# Patient Record
Sex: FEMALE | Race: WHITE | NOT HISPANIC OR LATINO | Employment: FULL TIME | URBAN - METROPOLITAN AREA
[De-identification: names, ages, dates, MRNs, and addresses within clinical notes are randomized per-mention and may not be internally consistent; named-entity substitution may affect disease eponyms.]

---

## 2020-01-23 ENCOUNTER — APPOINTMENT (OUTPATIENT)
Dept: GENERAL RADIOLOGY | Facility: HOSPITAL | Age: 50
End: 2020-01-23

## 2020-01-23 ENCOUNTER — APPOINTMENT (OUTPATIENT)
Dept: CT IMAGING | Facility: HOSPITAL | Age: 50
End: 2020-01-23

## 2020-01-23 ENCOUNTER — HOSPITAL ENCOUNTER (OUTPATIENT)
Facility: HOSPITAL | Age: 50
Setting detail: OBSERVATION
Discharge: HOME | End: 2020-01-24
Attending: EMERGENCY MEDICINE | Admitting: INTERNAL MEDICINE

## 2020-01-23 DIAGNOSIS — R29.90 NEW ONSET SEIZURE WITH ABNORMAL NEUROLOGICAL EXAM WITHOUT HEAD TRAUMA (HCC): Primary | ICD-10-CM

## 2020-01-23 DIAGNOSIS — R56.9 NEW ONSET SEIZURE WITH ABNORMAL NEUROLOGICAL EXAM WITHOUT HEAD TRAUMA (HCC): Primary | ICD-10-CM

## 2020-01-23 DIAGNOSIS — G45.9 TIA (TRANSIENT ISCHEMIC ATTACK): ICD-10-CM

## 2020-01-23 DIAGNOSIS — I10 ELEVATED BLOOD PRESSURE READING WITH DIAGNOSIS OF HYPERTENSION: ICD-10-CM

## 2020-01-23 DIAGNOSIS — B34.9 ACUTE VIRAL SYNDROME: ICD-10-CM

## 2020-01-23 DIAGNOSIS — R56.9 SEIZURE-LIKE ACTIVITY (HCC): ICD-10-CM

## 2020-01-23 DIAGNOSIS — R53.1 RIGHT SIDED WEAKNESS: ICD-10-CM

## 2020-01-23 PROBLEM — J06.9 UPPER RESPIRATORY INFECTION: Status: ACTIVE | Noted: 2020-01-23

## 2020-01-23 PROBLEM — I63.9 CVA (CEREBRAL VASCULAR ACCIDENT) (HCC): Status: ACTIVE | Noted: 2020-01-23

## 2020-01-23 PROBLEM — Z72.0 TOBACCO ABUSE: Status: ACTIVE | Noted: 2020-01-23

## 2020-01-23 LAB
ALBUMIN SERPL-MCNC: 4.9 G/DL (ref 3.5–5.2)
ALBUMIN/GLOB SERPL: 1.6 G/DL
ALP SERPL-CCNC: 71 U/L (ref 39–117)
ALT SERPL W P-5'-P-CCNC: 35 U/L (ref 1–33)
ALT SERPL W P-5'-P-CCNC: 46 U/L (ref 1–33)
ANION GAP SERPL CALCULATED.3IONS-SCNC: 15 MMOL/L (ref 5–15)
APTT PPP: 27.1 SECONDS (ref 24–37)
AST SERPL-CCNC: 43 U/L (ref 1–32)
AST SERPL-CCNC: 43 U/L (ref 1–32)
B PARAPERT DNA SPEC QL NAA+PROBE: NOT DETECTED
B PERT DNA SPEC QL NAA+PROBE: NOT DETECTED
BASE EXCESS BLDA CALC-SCNC: 7 MMOL/L (ref -5–5)
BASOPHILS # BLD AUTO: 0.06 10*3/MM3 (ref 0–0.2)
BASOPHILS NFR BLD AUTO: 0.7 % (ref 0–1.5)
BILIRUB SERPL-MCNC: 0.4 MG/DL (ref 0.2–1.2)
BUN BLD-MCNC: 7 MG/DL (ref 6–20)
BUN/CREAT SERPL: 8.2 (ref 7–25)
C PNEUM DNA NPH QL NAA+NON-PROBE: NOT DETECTED
CA-I BLDA-SCNC: 1.23 MMOL/L (ref 1.2–1.32)
CALCIUM SPEC-SCNC: 9.7 MG/DL (ref 8.6–10.5)
CHLORIDE SERPL-SCNC: 98 MMOL/L (ref 98–107)
CO2 BLDA-SCNC: 35 MMOL/L (ref 24–29)
CO2 SERPL-SCNC: 28 MMOL/L (ref 22–29)
CREAT BLD-MCNC: 0.85 MG/DL (ref 0.57–1)
CREAT BLDA-MCNC: 0.9 MG/DL (ref 0.6–1.3)
D-LACTATE SERPL-SCNC: 1.8 MMOL/L (ref 0.5–2)
DEPRECATED RDW RBC AUTO: 38.8 FL (ref 37–54)
DEPRECATED RDW RBC AUTO: 40.6 FL (ref 37–54)
EOSINOPHIL # BLD AUTO: 0.11 10*3/MM3 (ref 0–0.4)
EOSINOPHIL NFR BLD AUTO: 1.2 % (ref 0.3–6.2)
ERYTHROCYTE [DISTWIDTH] IN BLOOD BY AUTOMATED COUNT: 11.4 % (ref 12.3–15.4)
ERYTHROCYTE [DISTWIDTH] IN BLOOD BY AUTOMATED COUNT: 11.5 % (ref 12.3–15.4)
FLUAV H1 2009 PAND RNA NPH QL NAA+PROBE: NOT DETECTED
FLUAV H1 HA GENE NPH QL NAA+PROBE: NOT DETECTED
FLUAV H3 RNA NPH QL NAA+PROBE: NOT DETECTED
FLUAV SUBTYP SPEC NAA+PROBE: NOT DETECTED
FLUBV RNA ISLT QL NAA+PROBE: NOT DETECTED
GFR SERPL CREATININE-BSD FRML MDRD: 71 ML/MIN/1.73
GLOBULIN UR ELPH-MCNC: 3 GM/DL
GLUCOSE BLD-MCNC: 88 MG/DL (ref 65–99)
HADV DNA SPEC NAA+PROBE: NOT DETECTED
HCO3 BLDA-SCNC: 32.9 MMOL/L (ref 22–26)
HCOV 229E RNA SPEC QL NAA+PROBE: NOT DETECTED
HCOV HKU1 RNA SPEC QL NAA+PROBE: NOT DETECTED
HCOV NL63 RNA SPEC QL NAA+PROBE: NOT DETECTED
HCOV OC43 RNA SPEC QL NAA+PROBE: NOT DETECTED
HCT VFR BLD AUTO: 46.2 % (ref 34–46.6)
HCT VFR BLD AUTO: 47.7 % (ref 34–46.6)
HCT VFR BLDA CALC: 47 % (ref 38–51)
HGB BLD-MCNC: 15 G/DL (ref 12–15.9)
HGB BLD-MCNC: 15.9 G/DL (ref 12–15.9)
HGB BLDA-MCNC: 16 G/DL (ref 12–17)
HMPV RNA NPH QL NAA+NON-PROBE: NOT DETECTED
HOLD SPECIMEN: NORMAL
HOLD SPECIMEN: NORMAL
HPIV1 RNA SPEC QL NAA+PROBE: NOT DETECTED
HPIV2 RNA SPEC QL NAA+PROBE: NOT DETECTED
HPIV3 RNA NPH QL NAA+PROBE: NOT DETECTED
HPIV4 P GENE NPH QL NAA+PROBE: NOT DETECTED
IMM GRANULOCYTES # BLD AUTO: 0.02 10*3/MM3 (ref 0–0.05)
IMM GRANULOCYTES NFR BLD AUTO: 0.2 % (ref 0–0.5)
INR PPP: 1 (ref 0.8–1.2)
LYMPHOCYTES # BLD AUTO: 4.05 10*3/MM3 (ref 0.7–3.1)
LYMPHOCYTES NFR BLD AUTO: 45.7 % (ref 19.6–45.3)
M PNEUMO IGG SER IA-ACNC: NOT DETECTED
MCH RBC QN AUTO: 30.6 PG (ref 26.6–33)
MCH RBC QN AUTO: 31.1 PG (ref 26.6–33)
MCHC RBC AUTO-ENTMCNC: 32.5 G/DL (ref 31.5–35.7)
MCHC RBC AUTO-ENTMCNC: 33.3 G/DL (ref 31.5–35.7)
MCV RBC AUTO: 91.7 FL (ref 79–97)
MCV RBC AUTO: 95.7 FL (ref 79–97)
MONOCYTES # BLD AUTO: 0.51 10*3/MM3 (ref 0.1–0.9)
MONOCYTES NFR BLD AUTO: 5.8 % (ref 5–12)
NEUTROPHILS # BLD AUTO: 4.11 10*3/MM3 (ref 1.7–7)
NEUTROPHILS NFR BLD AUTO: 46.4 % (ref 42.7–76)
NRBC BLD AUTO-RTO: 0 /100 WBC (ref 0–0.2)
PCO2 BLDA: 62 MM HG (ref 35–45)
PH BLDA: 7.33 PH UNITS (ref 7.35–7.6)
PLATELET # BLD AUTO: 249 10*3/MM3 (ref 140–450)
PLATELET # BLD AUTO: 285 10*3/MM3 (ref 140–450)
PMV BLD AUTO: 10.8 FL (ref 6–12)
PMV BLD AUTO: 10.8 FL (ref 6–12)
PO2 BLDA: 20 MMHG (ref 80–105)
POTASSIUM BLD-SCNC: 4.7 MMOL/L (ref 3.5–5.2)
POTASSIUM BLDA-SCNC: 4.6 MMOL/L (ref 3.5–4.9)
PROT SERPL-MCNC: 7.9 G/DL (ref 6–8.5)
PROTHROMBIN TIME: 11.4 SECONDS (ref 12.8–15.2)
RBC # BLD AUTO: 4.83 10*6/MM3 (ref 3.77–5.28)
RBC # BLD AUTO: 5.2 10*6/MM3 (ref 3.77–5.28)
RHINOVIRUS RNA SPEC NAA+PROBE: NOT DETECTED
RSV RNA NPH QL NAA+NON-PROBE: NOT DETECTED
SAO2 % BLDA: 27 % (ref 95–98)
SODIUM BLD-SCNC: 141 MMOL/L (ref 136–145)
SODIUM BLDA-SCNC: 141 MMOL/L (ref 138–146)
TROPONIN T SERPL-MCNC: <0.01 NG/ML (ref 0–0.03)
WBC NRBC COR # BLD: 8.86 10*3/MM3 (ref 3.4–10.8)
WBC NRBC COR # BLD: 9.44 10*3/MM3 (ref 3.4–10.8)
WHOLE BLOOD HOLD SPECIMEN: NORMAL
WHOLE BLOOD HOLD SPECIMEN: NORMAL

## 2020-01-23 PROCEDURE — G0378 HOSPITAL OBSERVATION PER HR: HCPCS

## 2020-01-23 PROCEDURE — 85610 PROTHROMBIN TIME: CPT

## 2020-01-23 PROCEDURE — 99406 BEHAV CHNG SMOKING 3-10 MIN: CPT | Performed by: FAMILY MEDICINE

## 2020-01-23 PROCEDURE — 93005 ELECTROCARDIOGRAM TRACING: CPT | Performed by: EMERGENCY MEDICINE

## 2020-01-23 PROCEDURE — 99220 PR INITIAL OBSERVATION CARE/DAY 70 MINUTES: CPT | Performed by: FAMILY MEDICINE

## 2020-01-23 PROCEDURE — 85730 THROMBOPLASTIN TIME PARTIAL: CPT | Performed by: EMERGENCY MEDICINE

## 2020-01-23 PROCEDURE — 84132 ASSAY OF SERUM POTASSIUM: CPT

## 2020-01-23 PROCEDURE — 85027 COMPLETE CBC AUTOMATED: CPT | Performed by: FAMILY MEDICINE

## 2020-01-23 PROCEDURE — 84450 TRANSFERASE (AST) (SGOT): CPT | Performed by: EMERGENCY MEDICINE

## 2020-01-23 PROCEDURE — 84460 ALANINE AMINO (ALT) (SGPT): CPT | Performed by: EMERGENCY MEDICINE

## 2020-01-23 PROCEDURE — 85014 HEMATOCRIT: CPT

## 2020-01-23 PROCEDURE — 99285 EMERGENCY DEPT VISIT HI MDM: CPT

## 2020-01-23 PROCEDURE — 82803 BLOOD GASES ANY COMBINATION: CPT

## 2020-01-23 PROCEDURE — 84484 ASSAY OF TROPONIN QUANT: CPT | Performed by: EMERGENCY MEDICINE

## 2020-01-23 PROCEDURE — 0042T HC CT CEREBRAL PERFUSION W/WO CONTRAST: CPT

## 2020-01-23 PROCEDURE — 83605 ASSAY OF LACTIC ACID: CPT | Performed by: EMERGENCY MEDICINE

## 2020-01-23 PROCEDURE — 71045 X-RAY EXAM CHEST 1 VIEW: CPT

## 2020-01-23 PROCEDURE — 82565 ASSAY OF CREATININE: CPT

## 2020-01-23 PROCEDURE — 70496 CT ANGIOGRAPHY HEAD: CPT

## 2020-01-23 PROCEDURE — 70498 CT ANGIOGRAPHY NECK: CPT

## 2020-01-23 PROCEDURE — 82330 ASSAY OF CALCIUM: CPT

## 2020-01-23 PROCEDURE — 84295 ASSAY OF SERUM SODIUM: CPT

## 2020-01-23 PROCEDURE — 0100U HC BIOFIRE FILMARRAY RESP PANEL 2: CPT | Performed by: FAMILY MEDICINE

## 2020-01-23 PROCEDURE — 82947 ASSAY GLUCOSE BLOOD QUANT: CPT

## 2020-01-23 PROCEDURE — 70450 CT HEAD/BRAIN W/O DYE: CPT

## 2020-01-23 PROCEDURE — 80053 COMPREHEN METABOLIC PANEL: CPT | Performed by: FAMILY MEDICINE

## 2020-01-23 PROCEDURE — 85025 COMPLETE CBC W/AUTO DIFF WBC: CPT | Performed by: EMERGENCY MEDICINE

## 2020-01-23 PROCEDURE — 0 IOPAMIDOL PER 1 ML: Performed by: EMERGENCY MEDICINE

## 2020-01-23 RX ORDER — ONDANSETRON 2 MG/ML
4 INJECTION INTRAMUSCULAR; INTRAVENOUS EVERY 6 HOURS PRN
Status: DISCONTINUED | OUTPATIENT
Start: 2020-01-23 | End: 2020-01-24 | Stop reason: HOSPADM

## 2020-01-23 RX ORDER — SODIUM CHLORIDE 0.9 % (FLUSH) 0.9 %
10 SYRINGE (ML) INJECTION EVERY 12 HOURS SCHEDULED
Status: DISCONTINUED | OUTPATIENT
Start: 2020-01-23 | End: 2020-01-24 | Stop reason: HOSPADM

## 2020-01-23 RX ORDER — ASPIRIN 300 MG/1
300 SUPPOSITORY RECTAL DAILY
Status: DISCONTINUED | OUTPATIENT
Start: 2020-01-23 | End: 2020-01-24 | Stop reason: HOSPADM

## 2020-01-23 RX ORDER — ACETAMINOPHEN 650 MG/1
650 SUPPOSITORY RECTAL EVERY 4 HOURS PRN
Status: DISCONTINUED | OUTPATIENT
Start: 2020-01-23 | End: 2020-01-24 | Stop reason: HOSPADM

## 2020-01-23 RX ORDER — ATORVASTATIN CALCIUM 40 MG/1
80 TABLET, FILM COATED ORAL NIGHTLY
Status: DISCONTINUED | OUTPATIENT
Start: 2020-01-23 | End: 2020-01-24 | Stop reason: HOSPADM

## 2020-01-23 RX ORDER — LORAZEPAM 2 MG/ML
1 INJECTION INTRAMUSCULAR EVERY 4 HOURS PRN
Status: DISCONTINUED | OUTPATIENT
Start: 2020-01-23 | End: 2020-01-24 | Stop reason: HOSPADM

## 2020-01-23 RX ORDER — ASPIRIN 81 MG/1
81 TABLET, CHEWABLE ORAL DAILY
Status: DISCONTINUED | OUTPATIENT
Start: 2020-01-23 | End: 2020-01-24 | Stop reason: HOSPADM

## 2020-01-23 RX ORDER — BISACODYL 10 MG
10 SUPPOSITORY, RECTAL RECTAL DAILY PRN
Status: DISCONTINUED | OUTPATIENT
Start: 2020-01-23 | End: 2020-01-24 | Stop reason: HOSPADM

## 2020-01-23 RX ORDER — SODIUM CHLORIDE 0.9 % (FLUSH) 0.9 %
10 SYRINGE (ML) INJECTION AS NEEDED
Status: DISCONTINUED | OUTPATIENT
Start: 2020-01-23 | End: 2020-01-24 | Stop reason: HOSPADM

## 2020-01-23 RX ORDER — DOCUSATE SODIUM 100 MG/1
100 CAPSULE, LIQUID FILLED ORAL 2 TIMES DAILY PRN
Status: DISCONTINUED | OUTPATIENT
Start: 2020-01-23 | End: 2020-01-24 | Stop reason: HOSPADM

## 2020-01-23 RX ORDER — ACETAMINOPHEN 325 MG/1
650 TABLET ORAL EVERY 4 HOURS PRN
Status: DISCONTINUED | OUTPATIENT
Start: 2020-01-23 | End: 2020-01-24 | Stop reason: HOSPADM

## 2020-01-23 RX ORDER — SODIUM CHLORIDE 9 MG/ML
100 INJECTION, SOLUTION INTRAVENOUS CONTINUOUS
Status: DISCONTINUED | OUTPATIENT
Start: 2020-01-23 | End: 2020-01-24 | Stop reason: HOSPADM

## 2020-01-23 RX ADMIN — IOPAMIDOL 115 ML: 755 INJECTION, SOLUTION INTRAVENOUS at 16:20

## 2020-01-23 RX ADMIN — ASPIRIN 81 MG 81 MG: 81 TABLET ORAL at 21:43

## 2020-01-23 RX ADMIN — SODIUM CHLORIDE 100 ML/HR: 9 INJECTION, SOLUTION INTRAVENOUS at 23:29

## 2020-01-23 RX ADMIN — SODIUM CHLORIDE, POTASSIUM CHLORIDE, SODIUM LACTATE AND CALCIUM CHLORIDE 1000 ML: 600; 310; 30; 20 INJECTION, SOLUTION INTRAVENOUS at 16:43

## 2020-01-23 RX ADMIN — ACETAMINOPHEN 650 MG: 325 TABLET, FILM COATED ORAL at 21:43

## 2020-01-23 RX ADMIN — ATORVASTATIN CALCIUM 80 MG: 40 TABLET, FILM COATED ORAL at 21:43

## 2020-01-23 NOTE — ED PROVIDER NOTES
"Subjective   The patient presents to the ER with CC of right side weakness. Patient last known well at 11:30am and family found her with deficit at around 2pm. The patient cannot recall when she developed symptoms inside that window. The patient has a hx of htn. She is not on any anticoagulant. No hx of similar. The flight crew reports that ground EMS witnessed \"seizure like activity\" and they gave 5mg of Ativan. Patient denies any hx of seizure. Flight reports flacid right side and completely aphasic on initial contact that has improved enroute. The patient does report viral symptoms for the past few days.       History provided by:  EMS personnel and patient      Review of Systems   Respiratory: Negative.    Cardiovascular: Negative.    Musculoskeletal: Positive for myalgias.   Neurological: Positive for seizures, weakness, numbness and headaches.   Psychiatric/Behavioral: Negative.    All other systems reviewed and are negative.      Past Medical History:   Diagnosis Date   • Hypertension        No Known Allergies    Past Surgical History:   Procedure Laterality Date   • CHOLECYSTECTOMY     • HYSTERECTOMY      PARTIAL   • TUBAL ABDOMINAL LIGATION         History reviewed. No pertinent family history.    Social History     Socioeconomic History   • Marital status: Significant Other     Spouse name: Not on file   • Number of children: Not on file   • Years of education: Not on file   • Highest education level: Not on file   Tobacco Use   • Smoking status: Current Every Day Smoker     Packs/day: 0.50     Types: Cigarettes, Electronic Cigarette   Substance and Sexual Activity   • Alcohol use: Never     Frequency: Never   • Drug use: Never           Objective   Physical Exam   Constitutional: She is oriented to person, place, and time. She appears well-developed and well-nourished.   HENT:   Head: Normocephalic and atraumatic.   Mouth/Throat: Oropharynx is clear and moist.   Eyes: Pupils are equal, round, and reactive " to light. EOM are normal.   Neck: Normal range of motion.   Cardiovascular: Normal rate, regular rhythm, normal heart sounds and intact distal pulses.   Pulmonary/Chest: Effort normal and breath sounds normal. No respiratory distress.   Abdominal: Soft. Bowel sounds are normal. There is no tenderness.   Musculoskeletal: She exhibits no tenderness.   Neurological: She is alert and oriented to person, place, and time.   The patient has mild right arm drift with minimal right leg lift against gravity. No facial asymmetry. Mild aphasia. Mild decreased sensation on right. NIHSS 5 on arrival.    Skin: Skin is warm and dry. Capillary refill takes less than 2 seconds.   Psychiatric: She has a normal mood and affect. Her behavior is normal.   Nursing note and vitals reviewed.      Critical Care  Performed by: Max Antonio MD  Authorized by: Max Antonio MD     Critical care provider statement:     Critical care time (minutes):  45    Critical care end time:  1/23/2020 6:22 PM    Critical care time was exclusive of:  Separately billable procedures and treating other patients    Critical care was necessary to treat or prevent imminent or life-threatening deterioration of the following conditions:  CNS failure or compromise    Critical care was time spent personally by me on the following activities:  Development of treatment plan with patient or surrogate, discussions with consultants, examination of patient, ordering and review of laboratory studies, ordering and review of radiographic studies, pulse oximetry and re-evaluation of patient's condition               ED Course  ED Course as of Jan 23 1822   u Jan 23, 2020   1622 Patient evaluated on arrival with the interventional stroke team. Patient currently outside the window for TPA. Will order perfusion and further studies for treatment options.     [RS]   1628 Negative perfusion for acute ischemic infarct.   CT Cerebral Perfusion With & Without Contrast  [RS]   1739 Patient with findings most c/w new onset seizure and viral syndrome. Will admit for further evaluation. Hospitalist paged for admission.    [RS]   1743 Case discussed with Dr. Littlejohn who will admit.     [RS]   1816 Lactate: 1.8 [RS]      ED Course User Index  [RS] Max Antonio MD                                               MDM  Number of Diagnoses or Management Options  Acute viral syndrome:   Elevated blood pressure reading with diagnosis of hypertension:   New onset seizure with abnormal neurological exam without head trauma (CMS/HCC):   Right sided weakness:   Diagnosis management comments: Recent Results (from the past 24 hour(s))  -POC Protime / INR  Collection Time: 01/23/20  4:20 PM       Result                      Value             Ref Range           Protime                     11.4 (L)          12.8 - 15.2 *       INR                         1.0               0.8 - 1.2      -POC Surgery Labs  Collection Time: 01/23/20  4:21 PM       Result                      Value             Ref Range           Ionized Calcium             1.23              1.20 - 1.32 *       POC Potassium               4.6               3.5 - 4.9 mm*       Sodium                      141               138 - 146 mm*       Total CO2                   35 (H)            24 - 29 mmol*       Hemoglobin                  16.0              12.0 - 17.0 *       Hematocrit                  47                38 - 51 %           pCO2, Arterial              62.0 (H)          35 - 45 mm Hg       pO2, Arterial               20 (L)            80 - 105 mmHg       Base Excess                 7.0000 (H)        -5 - 5 mmol/L       O2 Saturation, Arterial     27 (L)            95 - 98 %           pH, Arterial                7.33 (L)          7.35 - 7.6 p*       HCO3, Arterial              32.9 (H)          22 - 26 mmol*  -POC Creatinine  Collection Time: 01/23/20  4:21 PM       Result                      Value             Ref Range            Creatinine                  0.90              0.60 - 1.30 *  -aPTT  Collection Time: 01/23/20  4:26 PM       Result                      Value             Ref Range           PTT                         27.1              24.0 - 37.0 *  -Light Blue Top  Collection Time: 01/23/20  4:26 PM       Result                      Value             Ref Range           Extra Tube                                                    hold for add-on  -Gold Top - SST  Collection Time: 01/23/20  4:26 PM       Result                      Value             Ref Range           Extra Tube                                                    Hold for add-ons.  -Troponin  Collection Time: 01/23/20  4:27 PM       Result                      Value             Ref Range           Troponin T                  <0.010            0.000 - 0.03*  -AST  Collection Time: 01/23/20  4:27 PM       Result                      Value             Ref Range           AST (SGOT)                  43 (H)            1 - 32 U/L     -ALT  Collection Time: 01/23/20  4:27 PM       Result                      Value             Ref Range           ALT (SGPT)                  35 (H)            1 - 33 U/L     -Green Top (Gel)  Collection Time: 01/23/20  4:27 PM       Result                      Value             Ref Range           Extra Tube                                                    Hold for add-ons.  -Lavender Top  Collection Time: 01/23/20  4:27 PM       Result                      Value             Ref Range           Extra Tube                                                    hold for add-on  -CBC Auto Differential  Collection Time: 01/23/20  4:27 PM       Result                      Value             Ref Range           WBC                         8.86              3.40 - 10.80*       RBC                         5.20              3.77 - 5.28 *       Hemoglobin                  15.9              12.0 - 15.9 *       Hematocrit                  47.7  (H)          34.0 - 46.6 %       MCV                         91.7              79.0 - 97.0 *       MCH                         30.6              26.6 - 33.0 *       MCHC                        33.3              31.5 - 35.7 *       RDW                         11.4 (L)          12.3 - 15.4 %       RDW-SD                      38.8              37.0 - 54.0 *       MPV                         10.8              6.0 - 12.0 fL       Platelets                   285               140 - 450 10*       Neutrophil %                46.4              42.7 - 76.0 %       Lymphocyte %                45.7 (H)          19.6 - 45.3 %       Monocyte %                  5.8               5.0 - 12.0 %        Eosinophil %                1.2               0.3 - 6.2 %         Basophil %                  0.7               0.0 - 1.5 %         Immature Grans %            0.2               0.0 - 0.5 %         Neutrophils, Absolute       4.11              1.70 - 7.00 *       Lymphocytes, Absolute       4.05 (H)          0.70 - 3.10 *       Monocytes, Absolute         0.51              0.10 - 0.90 *       Eosinophils, Absolute       0.11              0.00 - 0.40 *       Basophils, Absolute         0.06              0.00 - 0.20 *       Immature Grans, Absolu*     0.02              0.00 - 0.05 *       nRBC                        0.0               0.0 - 0.2 /1*  -Lactic Acid, Plasma  Collection Time: 01/23/20  5:38 PM       Result                      Value             Ref Range           Lactate                     1.8               0.5 - 2.0 mm*  Note: In addition to lab results from this visit, the labs listed above may include labs taken at another facility or during a different encounter within the last 24 hours. Please correlate lab times with ED admission and discharge times for further clarification of the services performed during this visit.    XR Chest 1 View   Final Result    No active disease.         DICTATED:   01/23/2020    EDITED/ls :   " 01/23/2020          This report was finalized on 1/23/2020 6:14 PM by Dr. Chapincito Jorgensen MD.          CT Cerebral Perfusion With & Without Contrast   Preliminary Result    No perfusion defect to suggest reversible ischemia or core    infarct. Consider follow-up MRI imaging of the brain for for detailed    evaluation, particularly for small vessel ischemic change.                              CT Angiogram Head   Preliminary Result    No evidence for hemodynamically significant stenosis or    occlusion of the head or neck vasculature.         Nonspecific prominent anterior posterior chain lymph nodes identified,    the largest within the posterior superior lymph node chain measuring up    to 7 mm in short axis. Correlate physical exam.               CT Angiogram Neck   Preliminary Result    No evidence for hemodynamically significant stenosis or    occlusion of the head or neck vasculature.         Nonspecific prominent anterior posterior chain lymph nodes identified,    the largest within the posterior superior lymph node chain measuring up    to 7 mm in short axis. Correlate physical exam.               CT Head Without Contrast Stroke Protocol   Preliminary Result    No acute intracranial process is appreciated.               --------------------------------------               01/23/20 01/23/20                  1637          1715     --------------------------------------   BP:          176/91      (!) 182/69   BP Location:    Left arm                  Patient Position:      Lying                   Pulse:         63            79       Resp:          16                     Temp:   98.4 °F (36.9 °C)             TempSrc:      Oral                    SpO2:          98%          97%       Weight: 90.7 kg (200 lb)              Height:  165.1 cm (65\")              --------------------------------------  Medications  sodium chloride 0.9 % flush 10 mL (has no " administration in time range)  iopamidol (ISOVUE-370) 76 % injection 150 mL (115 mL Intravenous Given 1/23/20 1620)  lactated ringers bolus 1,000 mL (1,000 mL Intravenous New Bag 1/23/20 1643)  ECG/EMG Results (last 24 hours)     Procedure Component Value Units Date/Time    ECG 12 Lead ([id number]) Collected:  01/23/20 1636     Updated:  01/23/20 1635      ECG 12 Lead               Amount and/or Complexity of Data Reviewed  Clinical lab tests: reviewed  Tests in the radiology section of CPT®: reviewed  Decide to obtain previous medical records or to obtain history from someone other than the patient: yes  Obtain history from someone other than the patient: yes  Discuss the patient with other providers: yes  Independent visualization of images, tracings, or specimens: yes    Critical Care  Total time providing critical care: 30-74 minutes      Final diagnoses:   New onset seizure with abnormal neurological exam without head trauma (CMS/HCC)   Right sided weakness   Acute viral syndrome   Elevated blood pressure reading with diagnosis of hypertension            aMx Antonio MD  01/23/20 1187

## 2020-01-24 ENCOUNTER — APPOINTMENT (OUTPATIENT)
Dept: MRI IMAGING | Facility: HOSPITAL | Age: 50
End: 2020-01-24

## 2020-01-24 ENCOUNTER — APPOINTMENT (OUTPATIENT)
Dept: NEUROLOGY | Facility: HOSPITAL | Age: 50
End: 2020-01-24

## 2020-01-24 ENCOUNTER — APPOINTMENT (OUTPATIENT)
Dept: CARDIOLOGY | Facility: HOSPITAL | Age: 50
End: 2020-01-24

## 2020-01-24 VITALS
HEIGHT: 65 IN | DIASTOLIC BLOOD PRESSURE: 84 MMHG | RESPIRATION RATE: 18 BRPM | BODY MASS INDEX: 33.32 KG/M2 | WEIGHT: 200 LBS | SYSTOLIC BLOOD PRESSURE: 178 MMHG | HEART RATE: 63 BPM | OXYGEN SATURATION: 92 % | TEMPERATURE: 98 F

## 2020-01-24 PROBLEM — G45.9 TIA (TRANSIENT ISCHEMIC ATTACK): Status: ACTIVE | Noted: 2020-01-23

## 2020-01-24 LAB
ALBUMIN SERPL-MCNC: 3.8 G/DL (ref 3.5–5.2)
ALBUMIN/GLOB SERPL: 1.5 G/DL
ALP SERPL-CCNC: 56 U/L (ref 39–117)
ALT SERPL W P-5'-P-CCNC: 36 U/L (ref 1–33)
ANION GAP SERPL CALCULATED.3IONS-SCNC: 10 MMOL/L (ref 5–15)
AST SERPL-CCNC: 41 U/L (ref 1–32)
BH CV ECHO MEAS - AO MAX PG (FULL): 5.2 MMHG
BH CV ECHO MEAS - AO MAX PG: 8.6 MMHG
BH CV ECHO MEAS - AO MEAN PG (FULL): 2.4 MMHG
BH CV ECHO MEAS - AO MEAN PG: 3.9 MMHG
BH CV ECHO MEAS - AO ROOT AREA (BSA CORRECTED): 1.4
BH CV ECHO MEAS - AO ROOT AREA: 5.8 CM^2
BH CV ECHO MEAS - AO ROOT DIAM: 2.7 CM
BH CV ECHO MEAS - AO V2 MAX: 146.6 CM/SEC
BH CV ECHO MEAS - AO V2 MEAN: 92.9 CM/SEC
BH CV ECHO MEAS - AO V2 VTI: 33.7 CM
BH CV ECHO MEAS - AVA(I,A): 2 CM^2
BH CV ECHO MEAS - AVA(I,D): 2 CM^2
BH CV ECHO MEAS - AVA(V,A): 2.1 CM^2
BH CV ECHO MEAS - AVA(V,D): 2.1 CM^2
BH CV ECHO MEAS - BSA(HAYCOCK): 2.1 M^2
BH CV ECHO MEAS - BSA: 2 M^2
BH CV ECHO MEAS - BZI_BMI: 33.3 KILOGRAMS/M^2
BH CV ECHO MEAS - BZI_METRIC_HEIGHT: 165.1 CM
BH CV ECHO MEAS - BZI_METRIC_WEIGHT: 90.7 KG
BH CV ECHO MEAS - EDV(CUBED): 91 ML
BH CV ECHO MEAS - EDV(TEICH): 92.3 ML
BH CV ECHO MEAS - EF(CUBED): 71 %
BH CV ECHO MEAS - EF(TEICH): 62.8 %
BH CV ECHO MEAS - ESV(CUBED): 26.4 ML
BH CV ECHO MEAS - ESV(TEICH): 34.3 ML
BH CV ECHO MEAS - FS: 33.8 %
BH CV ECHO MEAS - IVS/LVPW: 1
BH CV ECHO MEAS - IVSD: 1.1 CM
BH CV ECHO MEAS - LA DIMENSION: 3.4 CM
BH CV ECHO MEAS - LA/AO: 1.3
BH CV ECHO MEAS - LAD MAJOR: 5.2 CM
BH CV ECHO MEAS - LAT PEAK E' VEL: 9.7 CM/SEC
BH CV ECHO MEAS - LATERAL E/E' RATIO: 8.4
BH CV ECHO MEAS - LV MASS(C)D: 179.8 GRAMS
BH CV ECHO MEAS - LV MASS(C)DI: 90.9 GRAMS/M^2
BH CV ECHO MEAS - LV MAX PG: 3.4 MMHG
BH CV ECHO MEAS - LV MEAN PG: 1.5 MMHG
BH CV ECHO MEAS - LV V1 MAX: 91.8 CM/SEC
BH CV ECHO MEAS - LV V1 MEAN: 57 CM/SEC
BH CV ECHO MEAS - LV V1 VTI: 21.1 CM
BH CV ECHO MEAS - LVIDD: 4.5 CM
BH CV ECHO MEAS - LVIDS: 3 CM
BH CV ECHO MEAS - LVOT AREA (M): 3.1 CM^2
BH CV ECHO MEAS - LVOT AREA: 3.3 CM^2
BH CV ECHO MEAS - LVOT DIAM: 2 CM
BH CV ECHO MEAS - LVPWD: 1.1 CM
BH CV ECHO MEAS - MED PEAK E' VEL: 6.4 CM/SEC
BH CV ECHO MEAS - MEDIAL E/E' RATIO: 12.7
BH CV ECHO MEAS - MV A MAX VEL: 72.6 CM/SEC
BH CV ECHO MEAS - MV DEC TIME: 0.24 SEC
BH CV ECHO MEAS - MV E MAX VEL: 82.9 CM/SEC
BH CV ECHO MEAS - MV E/A: 1.1
BH CV ECHO MEAS - PA ACC SLOPE: 786.8 CM/SEC^2
BH CV ECHO MEAS - PA ACC TIME: 0.11 SEC
BH CV ECHO MEAS - PA MAX PG: 4.4 MMHG
BH CV ECHO MEAS - PA PR(ACCEL): 29.9 MMHG
BH CV ECHO MEAS - PA V2 MAX: 104.5 CM/SEC
BH CV ECHO MEAS - SI(AO): 98.5 ML/M^2
BH CV ECHO MEAS - SI(CUBED): 32.7 ML/M^2
BH CV ECHO MEAS - SI(LVOT): 34.9 ML/M^2
BH CV ECHO MEAS - SI(TEICH): 29.3 ML/M^2
BH CV ECHO MEAS - SV(AO): 194.9 ML
BH CV ECHO MEAS - SV(CUBED): 64.6 ML
BH CV ECHO MEAS - SV(LVOT): 69 ML
BH CV ECHO MEAS - SV(TEICH): 58 ML
BH CV ECHO MEAS - TAPSE (>1.6): 2.9 CM2
BH CV ECHO MEASUREMENTS AVERAGE E/E' RATIO: 10.3
BH CV VAS BP RIGHT ARM: NORMAL MMHG
BH CV XLRA - RV BASE: 3.2 CM
BH CV XLRA - RV LENGTH: 6.8 CM
BH CV XLRA - RV MID: 2.8 CM
BH CV XLRA - TDI S': 12.5 CM/SEC
BILIRUB SERPL-MCNC: 0.3 MG/DL (ref 0.2–1.2)
BUN BLD-MCNC: 10 MG/DL (ref 6–20)
BUN/CREAT SERPL: 14.3 (ref 7–25)
CALCIUM SPEC-SCNC: 8.6 MG/DL (ref 8.6–10.5)
CHLORIDE SERPL-SCNC: 102 MMOL/L (ref 98–107)
CHOLEST SERPL-MCNC: 196 MG/DL (ref 0–200)
CO2 SERPL-SCNC: 29 MMOL/L (ref 22–29)
CREAT BLD-MCNC: 0.7 MG/DL (ref 0.57–1)
DEPRECATED RDW RBC AUTO: 40 FL (ref 37–54)
ERYTHROCYTE [DISTWIDTH] IN BLOOD BY AUTOMATED COUNT: 11.9 % (ref 12.3–15.4)
GFR SERPL CREATININE-BSD FRML MDRD: 89 ML/MIN/1.73
GLOBULIN UR ELPH-MCNC: 2.5 GM/DL
GLUCOSE BLD-MCNC: 102 MG/DL (ref 65–99)
GLUCOSE BLDC GLUCOMTR-MCNC: 102 MG/DL (ref 70–130)
GLUCOSE BLDC GLUCOMTR-MCNC: 96 MG/DL (ref 70–130)
HBA1C MFR BLD: 5.6 % (ref 4.8–5.6)
HCT VFR BLD AUTO: 41.8 % (ref 34–46.6)
HDLC SERPL-MCNC: 32 MG/DL (ref 40–60)
HGB BLD-MCNC: 13.7 G/DL (ref 12–15.9)
LDLC SERPL CALC-MCNC: 120 MG/DL (ref 0–100)
LDLC/HDLC SERPL: 3.76 {RATIO}
LEFT ATRIUM VOLUME INDEX: 21.2 ML/M^2
LEFT ATRIUM VOLUME: 42 ML
MAXIMAL PREDICTED HEART RATE: 170 BPM
MCH RBC QN AUTO: 30.4 PG (ref 26.6–33)
MCHC RBC AUTO-ENTMCNC: 32.8 G/DL (ref 31.5–35.7)
MCV RBC AUTO: 92.9 FL (ref 79–97)
PLATELET # BLD AUTO: 238 10*3/MM3 (ref 140–450)
PMV BLD AUTO: 11.2 FL (ref 6–12)
POTASSIUM BLD-SCNC: 3.7 MMOL/L (ref 3.5–5.2)
PROT SERPL-MCNC: 6.3 G/DL (ref 6–8.5)
RBC # BLD AUTO: 4.5 10*6/MM3 (ref 3.77–5.28)
SODIUM BLD-SCNC: 141 MMOL/L (ref 136–145)
STRESS TARGET HR: 145 BPM
TRIGL SERPL-MCNC: 218 MG/DL (ref 0–150)
VLDLC SERPL-MCNC: 43.6 MG/DL
WBC NRBC COR # BLD: 7.27 10*3/MM3 (ref 3.4–10.8)

## 2020-01-24 PROCEDURE — 80053 COMPREHEN METABOLIC PANEL: CPT | Performed by: FAMILY MEDICINE

## 2020-01-24 PROCEDURE — 95816 EEG AWAKE AND DROWSY: CPT

## 2020-01-24 PROCEDURE — G0378 HOSPITAL OBSERVATION PER HR: HCPCS

## 2020-01-24 PROCEDURE — 99245 OFF/OP CONSLTJ NEW/EST HI 55: CPT | Performed by: PSYCHIATRY & NEUROLOGY

## 2020-01-24 PROCEDURE — 93306 TTE W/DOPPLER COMPLETE: CPT

## 2020-01-24 PROCEDURE — 97162 PT EVAL MOD COMPLEX 30 MIN: CPT

## 2020-01-24 PROCEDURE — 82962 GLUCOSE BLOOD TEST: CPT

## 2020-01-24 PROCEDURE — 83036 HEMOGLOBIN GLYCOSYLATED A1C: CPT | Performed by: FAMILY MEDICINE

## 2020-01-24 PROCEDURE — 70551 MRI BRAIN STEM W/O DYE: CPT

## 2020-01-24 PROCEDURE — 80061 LIPID PANEL: CPT | Performed by: FAMILY MEDICINE

## 2020-01-24 PROCEDURE — 93306 TTE W/DOPPLER COMPLETE: CPT | Performed by: INTERNAL MEDICINE

## 2020-01-24 PROCEDURE — 97166 OT EVAL MOD COMPLEX 45 MIN: CPT

## 2020-01-24 PROCEDURE — 92523 SPEECH SOUND LANG COMPREHEN: CPT

## 2020-01-24 PROCEDURE — 99217 PR OBSERVATION CARE DISCHARGE MANAGEMENT: CPT | Performed by: INTERNAL MEDICINE

## 2020-01-24 PROCEDURE — 85027 COMPLETE CBC AUTOMATED: CPT | Performed by: FAMILY MEDICINE

## 2020-01-24 RX ORDER — ATENOLOL 100 MG/1
100 TABLET ORAL 2 TIMES DAILY
Qty: 60 TABLET | Refills: 0 | Status: SHIPPED | OUTPATIENT
Start: 2020-01-24 | End: 2020-02-23

## 2020-01-24 RX ORDER — ATENOLOL 100 MG/1
100 TABLET ORAL 2 TIMES DAILY
Status: ON HOLD | COMMUNITY
End: 2020-01-24 | Stop reason: SDUPTHER

## 2020-01-24 RX ORDER — CLONAZEPAM 1 MG/1
1 TABLET ORAL NIGHTLY
COMMUNITY

## 2020-01-24 RX ORDER — HYDROCODONE BITARTRATE AND ACETAMINOPHEN 10; 325 MG/1; MG/1
1 TABLET ORAL AS NEEDED
COMMUNITY

## 2020-01-24 RX ORDER — LISINOPRIL 10 MG/1
10 TABLET ORAL NIGHTLY
Status: ON HOLD | COMMUNITY
End: 2020-01-24 | Stop reason: SDUPTHER

## 2020-01-24 RX ORDER — LISINOPRIL 10 MG/1
10 TABLET ORAL NIGHTLY
Qty: 30 TABLET | Refills: 0 | Status: SHIPPED | OUTPATIENT
Start: 2020-01-24 | End: 2020-02-23

## 2020-01-24 RX ORDER — ATORVASTATIN CALCIUM 80 MG/1
80 TABLET, FILM COATED ORAL NIGHTLY
Qty: 30 TABLET | Refills: 0 | Status: SHIPPED | OUTPATIENT
Start: 2020-01-24 | End: 2020-01-24 | Stop reason: HOSPADM

## 2020-01-24 RX ORDER — ZOLPIDEM TARTRATE 5 MG/1
5 TABLET ORAL NIGHTLY
COMMUNITY

## 2020-01-24 RX ORDER — ASPIRIN 81 MG/1
81 TABLET, CHEWABLE ORAL DAILY
Qty: 30 TABLET | Refills: 0 | Status: SHIPPED | OUTPATIENT
Start: 2020-01-25 | End: 2020-02-24

## 2020-01-24 RX ORDER — VENLAFAXINE 75 MG/1
75 TABLET ORAL NIGHTLY
COMMUNITY

## 2020-01-24 RX ORDER — TIZANIDINE HYDROCHLORIDE 6 MG/1
6 CAPSULE, GELATIN COATED ORAL 2 TIMES DAILY
COMMUNITY

## 2020-01-24 RX ADMIN — ASPIRIN 81 MG 81 MG: 81 TABLET ORAL at 10:35

## 2020-01-24 RX ADMIN — SODIUM CHLORIDE, PRESERVATIVE FREE 10 ML: 5 INJECTION INTRAVENOUS at 10:35

## 2020-01-24 RX ADMIN — ACETAMINOPHEN 650 MG: 325 TABLET, FILM COATED ORAL at 13:42

## 2020-01-24 NOTE — PLAN OF CARE
Problem: Patient Care Overview  Goal: Plan of Care Review  Outcome: Ongoing (interventions implemented as appropriate)  Flowsheets (Taken 1/24/2020 5987)  Plan of Care Reviewed With: patient;family  Outcome Summary: GOALS NOT ESTABLISHED AS PT IS AT BASELINE WITH FUNCTIONAL MOBILITY. NO DEFICITS IN BALANCE, STRENGTH OR FUNCTIONAL MOBILITY. RECOMMEND D/C HOME. PT C/O MILD HA BUT STATES OTHERWISE SYMPTOMS HAVE RESOLVED.

## 2020-01-24 NOTE — THERAPY DISCHARGE NOTE
Acute Care - Occupational Therapy Initial Eval/Discharge  [facility]     Patient Name: Maru Goncalves  : 1970  MRN: [id number]  Today's Date: 2020  Onset of Illness/Injury or Date of Surgery: 20  Date of Referral to OT: 20         Admit Date: 2020       ICD-10-CM ICD-9-CM   1. New onset seizure with abnormal neurological exam without head trauma (CMS/HCC) R56.9 780.39    R29.90 781.99   2. Right sided weakness R53.1 728.87   3. Acute viral syndrome B34.9 079.99   4. Elevated blood pressure reading with diagnosis of hypertension I10 401.9     Patient Active Problem List   Diagnosis   • Tobacco abuse   • Upper respiratory infection   • TIA (transient ischemic attack)   • Seizure-like activity (CMS/HCC)   • New onset seizure with abnormal neurological exam without head trauma (CMS/HCC)     Past Medical History:   Diagnosis Date   • Hypertension      Past Surgical History:   Procedure Laterality Date   • CHOLECYSTECTOMY     • HYSTERECTOMY      PARTIAL   • TUBAL ABDOMINAL LIGATION            OT ASSESSMENT FLOWSHEET (last 12 hours)      Occupational Therapy Evaluation     Row Name 20 1315                   OT Evaluation Time/Intention    Subjective Information  complains of;pain  -KF        Document Type  discharge evaluation/summary  -KF        Mode of Treatment  occupational therapy  -KF        Patient Effort  excellent  -KF        Symptoms Noted During/After Treatment  none  -KF        Comment  light headache notified RN and elevated BP   -KF           General Information    Patient Profile Reviewed?  yes  -KF        Onset of Illness/Injury or Date of Surgery  20  -KF        Prior Level of Function  independent:;all household mobility;community mobility;gait;transfer;bed mobility;ADL's;home management;driving  -KF        Equipment Currently Used at Home  none  -KF        Existing Precautions/Restrictions  no known precautions/restrictions  -KF        Risks Reviewed  patient  and family:;nausea/vomiting;LOB;increased discomfort;dizziness;change in vital signs  -KF        Benefits Reviewed  patient and family:;improve function;increase independence;increase strength;increase balance;decrease pain;increase knowledge  -KF        Barriers to Rehab  none identified  -KF           Relationship/Environment    Primary Source of Support/Comfort  child(cristi)  -KF        Lives With  child(cristi), adult;grandchild(cristi);significant other  -KF        Family Caregiver if Needed  child(cristi), adult;significant other  -KF           Resource/Environmental Concerns    Current Living Arrangements  home/apartment/condo  -KF           Home Main Entrance    Number of Stairs, Main Entrance  four  -KF        Stair Railings, Main Entrance  railings on both sides of stairs  -KF           Cognitive Assessment/Interventions    Additional Documentation  Cognitive Assessment/Intervention (Group)  -KF           Cognitive Assessment/Intervention- PT/OT    Affect/Mental Status (Cognitive)  WNL  -KF        Orientation Status (Cognition)  oriented x 4  -KF        Follows Commands (Cognition)  WNL  -KF        Cognitive Function (Cognitive)  WNL  -KF        Safety Deficit (Cognitive)  other (see comments) good safety   -KF           Safety Issues, Functional Mobility    Safety Issues Affecting Function (Mobility)  other (see comments) no safety deficits demonstrated   -KF        Impairments Affecting Function (Mobility)  pain  -KF           Bed Mobility Assessment/Treatment    Bed Mobility Assessment/Treatment  scooting/bridging;supine-sit;sit-supine  -KF        Scooting/Bridging [place] (Bed Mobility)  independent  -KF        Supine-Sit [place] (Bed Mobility)  independent  -KF        Sit-Supine [place] (Bed Mobility)  independent  -KF        Bed Mobility, Safety Issues  other (see comments) none  -KF        Assistive Device (Bed Mobility)  bed rails;head of bed elevated  -KF        Comment (Bed Mobility)  demo  adequate trunk control and strength for I   -KF           Functional Mobility    Functional Mobility- Ind. Level  independent  -KF        Functional Mobility- Device  other (see comments) no AD   -KF        Functional Mobility- Comment  just completed functional mob with family in hallway no LOB   -KF           Transfer Assessment/Treatment    Transfer Assessment/Treatment  sit-stand transfer;stand-sit transfer  -KF        Comment (Transfers)  good safety, good balance throughout   -KF           Sit-Stand Transfer    Sit-Stand [place] (Transfers)  independent  -KF           Stand-Sit Transfer    Stand-Sit [place] (Transfers)  independent  -KF           ADL Assessment/Intervention    BADL Assessment/Intervention  lower body dressing;toileting  -KF           Lower Body Dressing Assessment/Training    Lower Body Dressing [place] Level  don;doff;socks;independent  -KF        Lower Body Dressing Position  edge of bed sitting  -KF           Toileting Assessment/Training    [place] Level (Toileting)  adjust/manage clothing;perform perineal hygiene;independent  -KF        Assistive Devices (Toileting)  commode  -KF        Comment (Toileting)  simulated activites and AROM demonstrates I   -KF           BADL Safety/Performance    Impairments, BADL Safety/Performance  other (see comments) none   -KF        Skilled BADL Treatment/Intervention  BADL process/adaptation training  -KF        Progress in BADL Status  improvement noted  -KF           General ROM    GENERAL ROM COMMENTS  BUE WNL   -KF           MMT (Manual Muscle Testing)    General MMT Comments  BUE grossly 5/5   -KF           Motor Assessment/Interventions    Additional Documentation  Balance (Group);Balance Interventions (Group);Fine Motor Testing & Training (Group);Gross Motor Coordination (Group)  -KF           Gross Motor Coordination    Gross Motor Impairments  AROM (active range of motion);finger to nose  -KF        Gross Motor Skill,  Impairments Detail  WNL   -KF           Balance    Balance  static sitting balance;static standing balance;dynamic sitting balance;dynamic standing balance  -KF           Static Sitting Balance    Level of [place] (Unsupported Sitting, Static Balance)  independent  -KF        Sitting Position (Unsupported Sitting, Static Balance)  sitting on edge of bed  -KF           Dynamic Sitting Balance    Level of [place], Reaches Outside Midline (Sitting, Dynamic Balance)  independent  -KF        Sitting Position, Reaches Outside Midline (Sitting, Dynamic Balance)  sitting on edge of bed  -KF           Static Standing Balance    Level of [place] (Supported Standing, Static Balance)  independent  -KF        Time Able to Maintain Position (Supported Standing, Static Balance)  2 to 3 minutes  -KF        Level of [place] (Unsupported Standing, Static Balance)  independent  -KF        Time Able to Maintain Position (Unsupported Standing, Static Balance)  1 to 2 minutes  -KF           Dynamic Standing Balance    Level of [place], Resists Mild Perturbations (Standing, Dynamic Balance)  independent  -KF        Time Able to Maintain Position, Resists Mild Perturbations (Standing, Dynamic Balance)  1 to 2 minutes  -KF        Comment, Resists Mild Perturbations (Sitting, Dynamic Balance)  no LOB against perturbations all planes  -KF           Fine Motor Testing & Training    Training Activity, Fine Motor Coordination  other (see comments) AROM opposition  -KF        Comment, Fine Motor Coordination  WNL   -KF           Sensory Assessment/Intervention    Sensory General Assessment  no sensation deficits identified  -KF        Additional Documentation  Vision Assessment/Intervention (Group)  -KF           Vision Assessment/Intervention    Visual Impairment/Limitations  WNL  -KF           Positioning and Restraints    Pre-Treatment Position  in bed  -KF        Post Treatment Position  bed  -KF        In Bed   notified nsg;supine;fowlers;call light within reach;encouraged to call for assist;with family/caregiver  -KF           Pain Assessment    Additional Documentation  Pain Scale: Numbers Pre/Post-Treatment (Group)  -KF           Pain Scale: Numbers Pre/Post-Treatment    Pain Scale: Numbers, Pretreatment  5/10  -KF        Pain Scale: Numbers, Post-Treatment  5/10  -KF        Pain Location - Side  Bilateral  -KF        Pain Location - Orientation  generalized  -KF        Pain Location  head  -KF        Pre/Post Treatment Pain Comment  Rn notified  -KF        Pain Intervention(s)  Repositioned;Ambulation/increased activity  -KF           Plan of Care Review    Plan of Care Reviewed With  patient;daughter;family  -KF        Progress  improving  -KF           Clinical Impression (OT)    Date of Referral to OT  01/23/20  -KF        OT Diagnosis  ADL decline   -KF        Patient/Family Goals Statement (OT Eval)  PLOF  -KF        Criteria for Skilled Therapeutic Interventions Met (OT Eval)  no;treatment indicated;no problems identified which require skilled intervention  -KF        Therapy Frequency (OT Eval)  evaluation only  -KF        Care Plan Review (OT)  evaluation/treatment results reviewed;care plan/treatment goals reviewed;risks/benefits reviewed;current/potential barriers reviewed;patient/other agree to care plan  -KF        Care Plan Review, Other Participant (OT Eval)  family  -KF        Anticipated Discharge Disposition (OT)  home  -KF           Vital Signs    Pre Systolic BP Rehab  178  -KF        Pre Treatment Diastolic BP  84  -KF        Post Systolic BP Rehab  176  -KF        Post Treatment Diastolic BP  106  -KF        Pretreatment Heart Rate (beats/min)  70  -KF        Intratreatment Heart Rate (beats/min)  73  -KF        Posttreatment Heart Rate (beats/min)  66  -KF        O2 Delivery Pre Treatment  room air  -KF        O2 Delivery Post Treatment  room air  -KF        Pre Patient Position  Supine  -KF         Intra Patient Position  Standing  -KF        Post Patient Position  Supine  -KF           Discharge Summary (Occupational Therapy)    Additional Documentation  Discharge Summary, OT Eval (Group)  -KF           Discharge Summary, OT Eval    Reason for Discharge (OT Discharge Summary)  no further needs identified  -KF           Living Environment    Home Accessibility  stairs to enter home;other (see comments) [place] shower   -KF          User Key  (r) = Recorded By, (t) = Taken By, (c) = Cosigned By    Initials Name Effective Dates    KF Eli Torres, OT 04/03/18 -           Occupational Therapy Education                 Title: PT OT SLP Therapies (Done)     Topic: Occupational Therapy (Done)     Point: ADL training (Done)     Description:   Instruct learner(s) on proper safety adaptation and remediation techniques during self care or transfers.   Instruct in proper use of assistive devices.              Learning Progress Summary           Patient Acceptance, E,TB,D, VU,DU by  at 1/24/2020 1320    Comment:  Purpose of OT services   Family Acceptance, E,TB,D, VU,DU by KF at 1/24/2020 1320    Comment:  Purpose of OT services                   Point: Home exercise program (Done)     Description:   Instruct learner(s) on appropriate technique for monitoring, assisting and/or progressing therapeutic exercises/activities.              Learning Progress Summary           Patient Acceptance, E,TB,D, VU,DU by KF at 1/24/2020 1320    Comment:  Purpose of OT services   Family Acceptance, E,TB,D, VU,DU by KF at 1/24/2020 1320    Comment:  Purpose of OT services                   Point: Precautions (Done)     Description:   Instruct learner(s) on prescribed precautions during self-care and functional transfers.              Learning Progress Summary           Patient Acceptance, E,TB,D, VU,DU by  at 1/24/2020 1320    Comment:  Purpose of OT services   Family Acceptance, E,TB,D, VU,DU by KF at 1/24/2020 1320    Comment:   Purpose of OT services                   Point: Body mechanics (Done)     Description:   Instruct learner(s) on proper positioning and spine alignment during self-care, functional mobility activities and/or exercises.              Learning Progress Summary           Patient Acceptance, E,TB,D, VU,DU by  at 1/24/2020 1320    Comment:  Purpose of OT services   Family Acceptance, E,TB,D, VU,DU by  at 1/24/2020 1320    Comment:  Purpose of OT services                               User Key     Initials Effective Dates Name Provider Type Discipline     04/03/18 -  Eli Torres, OT Occupational Therapist OT                OT Recommendation and Plan  Outcome Summary/Treatment Plan (OT)  Anticipated Discharge Disposition (OT): home  Reason for Discharge (OT Discharge Summary): no further needs identified  Therapy Frequency (OT Eval): evaluation only  Plan of Care Review  Plan of Care Reviewed With: patient, family  Plan of Care Reviewed With: patient, family  Outcome Summary: OT eval completed Pt presents at baseline ADL completion and mobility, I socks and LBD demonstrated, I transfers and functional mob no LOB; no further IPOT at this time, recom home at d/c         Outcome Measures     Row Name 01/24/20 1320             How much help from another is currently needed...    Putting on and taking off regular lower body clothing?  4  -KF      Bathing (including washing, rinsing, and drying)  4  -KF      Toileting (which includes using toilet bed pan or urinal)  4  -KF      Putting on and taking off regular upper body clothing  4  -KF      Taking care of personal grooming (such as brushing teeth)  4  -KF      Eating meals  4  -KF      AM-PAC 6 Clicks Score (OT)  24  -KF         Modified [place] Scale    Pre-Stroke Modified [place] Scale  0 - No Symptoms at all.  -KF      Modified [place] Scale  0 - No Symptoms at all.  -KF         Functional Assessment    Outcome Measure Options  AM-PAC 6 Clicks Daily Activity  (OT);Modified [place]  -KF        User Key  (r) = Recorded By, (t) = Taken By, (c) = Cosigned By    Initials Name Provider Type    Eli Kendall OT Occupational Therapist          Time Calculation:   Time Calculation- OT     Row Name 01/24/20 1320             Time Calculation- OT    OT Start Time  1320  -KF      Total Timed Code Minutes- OT  0 minute(s)  -KF      OT Received On  01/24/20  -KF        User Key  (r) = Recorded By, (t) = Taken By, (c) = Cosigned By    Initials Name Provider Type    Eli Kendall OT Occupational Therapist        Therapy Suggested Charges     Code   Minutes Charges    None           Therapy Charges for Today     Code Description Service Date Service Provider Modifiers Qty    [id number]  OT EVAL MOD COMPLEXITY 3 1/24/2020 Eli Torres OT GO 1               OT Discharge Summary  Anticipated Discharge Disposition (OT): home  Reason for Discharge: At baseline function  Outcomes Achieved: Refer to plan of care for updates on goals achieved  Discharge Destination: Home    Eli Torres OT  1/24/2020

## 2020-01-24 NOTE — PLAN OF CARE
Problem: Patient Care Overview  Goal: Plan of Care Review  Outcome: Ongoing (interventions implemented as appropriate)  Flowsheets (Taken 1/24/2020 2086)  Plan of Care Reviewed With: patient  Note:   SLP evaluation completed. Will sign-off as cognitive-communication appeared to be WFL. Please see note for further details and recommendations.

## 2020-01-24 NOTE — CONSULTS
Chart review for diabetes educator consult.    At the time of this review patient A1c is 5.6 , they have no noted history of diabetes and no home medications noted for treatment of diabetes. At this time we do not feel the patient would benefit from diabetes education. Thank you for this consult, should patient needs change please re consult us.

## 2020-01-24 NOTE — PLAN OF CARE
Problem: Patient Care Overview  Goal: Plan of Care Review  Flowsheets (Taken 1/24/2020 1320)  Progress: improving  Plan of Care Reviewed With: patient; family  Outcome Summary: OT eval completed Pt presents at baseline ADL completion and mobility, I socks and LBD demonstrated, I transfers and functional mob no LOB; no further IPOT at this time, recom home at d/c

## 2020-01-24 NOTE — PROGRESS NOTES
Case Management Discharge Note      Final Note: Plan is home with family. Family will transport. Denies any discharge needs.         Destination      No service has been selected for the patient.      Durable Medical Equipment      No service has been selected for the patient.      Dialysis/Infusion      No service has been selected for the patient.      Home Medical Care      No service has been selected for the patient.      Therapy      No service has been selected for the patient.      Community Resources      No service has been selected for the patient.             Final Discharge Disposition Code: 01 - home or self-care

## 2020-01-24 NOTE — PROGRESS NOTES
Discharge Planning Assessment  [facility]     Patient Name: Maru Goncalves  MRN: [id number]  Today's Date: 1/24/2020    Admit Date: 1/23/2020    Discharge Needs Assessment     Row Name 01/24/20 0911       Living Environment    Lives With  significant other    Name(s) of Who Lives With Patient  Ronnie Jesus (JOAQUÍN) [phone]    Current Living Arrangements  home/apartment/condo    Primary Care Provided by  self    Provides Primary Care For  no one    Family Caregiver if Needed  significant other    Family Caregiver Names  Ronnie Jesus ([place])    Quality of Family Relationships  helpful;involved;supportive    Able to Return to Prior Arrangements  yes       Resource/Environmental Concerns    Resource/Environmental Concerns  none    Transportation Concerns  car, none       Transition Planning    Patient/Family Anticipates Transition to  home with family    Patient/Family Anticipated Services at Transition  none    Transportation Anticipated  family or friend will provide       Discharge Needs Assessment    Readmission Within the Last 30 Days  no previous admission in last 30 days    Concerns to be Addressed  denies needs/concerns at this time    Equipment Currently Used at Home  none    Anticipated Changes Related to Illness  none    Equipment Needed After Discharge  none        Discharge Plan     Row Name 01/24/20 0951       Plan    Plan  Home with family    Patient/Family in Agreement with Plan  yes    Plan Comments   Spoke with patients at bedside. Lives with Ronnie Jesus (JOAQUÍN) [phone] in [facility]. Is independent with ADL's. No problems with insurance or medications. No DME at home. Plan is home with family. CM will continue to follow    Final Discharge Disposition Code  01 - home or self-care        Destination      Coordination has not been started for this encounter.      Durable Medical Equipment      Coordination has not been started for this encounter.      Dialysis/Infusion      Coordination has not  been started for this encounter.      Home Medical Care      Coordination has not been started for this encounter.      Therapy      Coordination has not been started for this encounter.      Community Resources      Coordination has not been started for this encounter.          Demographic Summary     Row Name 01/24/20 0910       General Information    Admission Type  inpatient    Arrived From  emergency department    Referral Source  admission list    Reason for Consult  discharge planning    Preferred Language  English     Used During This Interaction  no       Contact Information    Permission Granted to Share Info With      Contact Information Obtained for      Contact Information Comments  PCP is Leanna Campbell MD        Functional Status     Row Name 01/24/20 0911       Functional Status    Usual Activity Tolerance  good    Current Activity Tolerance  good       Functional Status, IADL    Medications  independent    Meal Preparation  independent    Housekeeping  independent    Laundry  independent    Shopping  independent       Mental Status    General Appearance WDL  WDL       Mental Status Summary    Recent Changes in Mental Status/Cognitive Functioning  no changes       Employment/    Employment Status  employed full time        Psychosocial    No documentation.       Abuse/Neglect    No documentation.       Legal    No documentation.       Substance Abuse    No documentation.       Patient Forms    No documentation.           Simone Moya RN

## 2020-01-24 NOTE — THERAPY DISCHARGE NOTE
Acute Care - Speech Language Pathology Initial Eval/Discharge  [facility]   Cognitive-Communication Evaluation     Patient Name: Maru Goncalves  : 1970  MRN: [id number]  Today's Date: 2020  Onset of Illness/Injury or Date of Surgery: 20            Admit Date: 2020     Visit Dx:    ICD-10-CM ICD-9-CM   1. New onset seizure with abnormal neurological exam without head trauma (CMS/HCC) R56.9 780.39    R29.90 781.99   2. Right sided weakness R53.1 728.87   3. Acute viral syndrome B34.9 079.99   4. Elevated blood pressure reading with diagnosis of hypertension I10 401.9   5. TIA (transient ischemic attack) G45.9 435.9   6. Seizure-like activity (CMS/HCC) R56.9 780.39     Patient Active Problem List   Diagnosis   • Tobacco abuse   • Upper respiratory infection   • TIA (transient ischemic attack)   • Seizure-like activity (CMS/HCC)   • New onset seizure with abnormal neurological exam without head trauma (CMS/HCC)     Past Medical History:   Diagnosis Date   • Hypertension      Past Surgical History:   Procedure Laterality Date   • CHOLECYSTECTOMY     • HYSTERECTOMY      PARTIAL   • TUBAL ABDOMINAL LIGATION            SLP EVALUATION (last 72 hours)      SLP SLC Evaluation     Row Name 20 1450                   Communication Assessment/Intervention    Document Type  evaluation  -AC        Subjective Information  no complaints  -AC        Patient Observations  alert;cooperative  -AC        Patient/Family Observations  Family @ bedside.  -AC        Patient Effort  good  -AC           General Information    Patient Profile Reviewed  yes  -AC        Pertinent History Of Current Problem  50yo adm w/ sz-like activity, r/o CVA. MRI negative for acute CVA. Pt passed RN CVA swallow screen.   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  -AC        Prior Level of Function-Communication  WFL  -AC        Plans/Goals Discussed with  patient and family;agreed upon  -AC        Barriers to Rehab  none identified  -AC        Patient's Goals for Discharge  return to home;return to work;return to all previous roles/activities  -        Family Goals for Discharge  family did not state  -           Pain Assessment    Additional Documentation  Pain Scale: FACES Pre/Post-Treatment (Group)  -AC           Pain Scale: FACES Pre/Post-Treatment    Pain: FACES Scale, Pretreatment  0-->no hurt  -AC        Pain: FACES Scale, Post-Treatment  0-->no hurt  -AC           Comprehension Assessment/Intervention    Comprehension Assessment/Intervention  Auditory Comprehension;Reading Comprehension  -AC           Auditory Comprehension Assessment/Intervention    Auditory Comprehension (Communication)  WFL  -AC        Answers Questions (Communication)  WFL;wh questions  -AC        Able to Follow Commands (Communication)  WFL;2-step  -AC        Narrative Discourse  WFL;conversational level  -AC           Reading Comprehension Assessment/Intervention    Reading Comprehension (Communication)  WFL  -AC        Functional Reading Tasks  WFL  -AC           Expression Assessment/Intervention    Expression Assessment/Intervention  verbal expression;graphic expression  -AC           Verbal Expression Assessment/Intervention    Verbal Expression  WFL  -AC        Automatic Speech (Communication)  WFL;response to greeting  -AC        Conversational Discourse/Fluency  WFL  -AC           Graphic Expression Assessment/Intervention    Graphic Expression  WFL;dominant hand  -AC        Graphic Expression, Comment  Pt able to generate complex sentence using 2 key words.  -AC           Motor Speech Assessment/Intervention    Motor Speech Function  WFL;unfamiliar listener;familiar listener  -AC        Conversational Speech (Communication)  WFL;moderate complexity  -AC        Motor Speech,  Comment  100% intelligible  -AC           Cognitive Assessment Intervention- SLP    Cognitive Function (Cognition)  WFL  -AC        Orientation Status (Cognition)  WFL;person;time;situation  -AC        Memory (Cognitive)  WFL;short-term;immediate;delayed;other (see comments) immediate/3-min delay: 5/5 words  -AC        Attention (Cognitive)  WFL;sustained  -AC        Thought Organization (Cognitive)  WFL;abstract convergent;verbal sequencing  -AC        Reasoning (Cognitive)  WFL;deductive  -AC        Problem Solving (Cognitive)  WFL;multifactorial  -AC           SLP Clinical Impressions    SLP Diagnosis  Functional cognitive-communication. Pt agreed no acute needs warranting SLP services identified @ this time.  -AC        SLC Criteria for Skilled Therapy Interventions Met  no problems identified which require skilled intervention  -          User Key  (r) = Recorded By, (t) = Taken By, (c) = Cosigned By    Initials Name Effective Dates    Alba Tony MS CCC-SLP 07/27/17 -            EDUCATION  The patient has been educated in the following areas:   Cognitive Impairment Communication Impairment.      SLP Recommendation and Plan  SLP Diagnosis: Functional cognitive-communication. Pt agreed no acute needs warranting SLP services identified @ this time.     SLC Criteria for Skilled Therapy Interventions Met: no problems identified which require skilled intervention  Anticipated Dischage Disposition: home          Plan of Care Reviewed With: patient              Time Calculation:   Time Calculation- SLP     Row Name 01/24/20 1529             Time Calculation- SLP    SLP Start Time  1450  -      SLP Received On  01/24/20  -        User Key  (r) = Recorded By, (t) = Taken By, (c) = Cosigned By    Initials Name Provider Type    Alba Tony MS CCC-SLP Speech and Language Pathologist          Therapy Charges for Today     Code Description Service Date Service Provider Modifiers Qty    [id number] [facility]  EVAL SPEECH AND PROD W LANG  2 1/24/2020 Alba Cerrato, MS CCC-SLP GN 1                   SLP Discharge Summary  Anticipated Dischage Disposition: home  Reason for Discharge: all goals and outcomes met, no further needs identified  Progress Toward Achieving Short/long Term Goals: discharge on same date as initial evaluation    Alba Cerrato MS CCC-SLP  1/24/2020

## 2020-01-24 NOTE — DISCHARGE SUMMARY
[facility] Medicine Services  DISCHARGE SUMMARY    Patient Name: Maru Goncalves  : 1970  MRN: [id number]    Date of Admission: 2020  4:20 PM  Date of Discharge: 2020  Primary Care Physician: Leanna Campbell APRN    Consults     Date and Time Order Name Status Description    2020 Inpatient Neurology Consult Stroke Completed     2020 1609 Inpatient Neurology Consult Stroke            Hospital Course     Presenting Problem:   New onset seizure with abnormal neurological exam without head trauma (CMS/HCC) [R56.9, R29.90]  New onset seizure with abnormal neurological exam without head trauma (CMS/HCC) [R56.9, R29.90]    Active Hospital Problems    Diagnosis  POA   • **Seizure-like activity (CMS/HCC) [R56.9]  Yes   • Tobacco abuse [Z72.0]  Yes   • Upper respiratory infection [J06.9]  Yes   • TIA (transient ischemic attack) [G45.9]  Yes   • New onset seizure with abnormal neurological exam without head trauma (CMS/HCC) [R56.9, R29.90]  Yes      Resolved Hospital Problems   No resolved problems to display.          Hospital Course:  Maru Goncalves is a 49 y.o. female PMH significant for tobacco use, who was transferred from OSH for altered mental status and stroke vs seizure like activity.  CT head was negative for acute findings.  They head and neck with no evidence of hemodynamically significant stenosis or occlusion of the head or neck vasculature.  MRI brain also without acute intracranial abnormality.  She had an EEG that was unremarkable, no epileptiform discharges seen.  She also had an echocardiogram with a negative saline bubble test.  Neurology evaluated her and was not convinced of a seizure, and no antiepileptic drugs were started.  Aspirin was started for possible TIA.  Atorvastatin 80 mg daily was also started for high ASCVD risk.  PT and OT had evaluated her and recommended home upon discharge.  Her symptoms had resolved by the end of hospital day  1.      Discharge Follow Up Recommendations for outpatient labs/diagnostics:  Follow-up with primary care provider Leanna Campbell as needed  Referral placed to neurology, will need follow-up in the next 2 to 4 weeks    Day of Discharge     HPI:   No acute events overnight.  Right did weakness resolved, speech difficulty also resolved.  She feels ready to go home.    Review of Systems  Gen- No fevers, chills  CV- No chest pain, palpitations  Resp- No cough, dyspnea  GI- No N/V/D, abd pain    Vital Signs:   Temp:  [98 °F (36.7 °C)-98.5 °F (36.9 °C)] 98 °F (36.7 °C)  Heart Rate:  [55-79] 63  Resp:  [16-18] 18  BP: (134-182)/(69-92) 178/84     Physical Exam:  Constitutional: No acute distress, awake, alert  HENT: NCAT, mucous membranes moist  Respiratory: Clear to auscultation bilaterally, respiratory effort normal on room air  Cardiovascular: RRR, no murmurs, no lower extremity edema  Gastrointestinal: Positive bowel sounds, soft, nontender, nondistended  Psychiatric: Appropriate affect, cooperative  Neurologic: Oriented x 3, strength symmetric in all extremities, Cranial Nerves grossly intact to confrontation, speech clear  Skin: No rashes      Pertinent  and/or Most Recent Results     Results from last 7 days   Lab Units 01/24/20  0742 01/23/20  2020 01/23/20  1627 01/23/20  1621   WBC 10*3/mm3 7.27 9.44 8.86  --    HEMOGLOBIN g/dL 13.7 15.0 15.9  --    HEMOGLOBIN, POC g/dL  --   --   --  16.0   HEMATOCRIT % 41.8 46.2 47.7*  --    HEMATOCRIT POC %  --   --   --  47   PLATELETS 10*3/mm3 238 249 285  --    SODIUM mmol/L 141  --  141  --    POTASSIUM mmol/L 3.7  --  4.7  --    CHLORIDE mmol/L 102  --  98  --    CO2 mmol/L 29.0  --  28.0  --    BUN mg/dL 10  --  7  --    CREATININE mg/dL 0.70  --  0.85 0.90   GLUCOSE mg/dL 102*  --  88  --    CALCIUM mg/dL 8.6  --  9.7  --      Results from last 7 days   Lab Units 01/24/20  0742 01/23/20  1627 01/23/20  1626 01/23/20  1620   BILIRUBIN mg/dL 0.3 0.4  --   --    ALK PHOS  U/L 56 71  --   --    ALT (SGPT) U/L 36* 46*  35*  --   --    AST (SGOT) U/L 41* 43*  43*  --   --    PROTIME seconds  --   --   --  11.4*   INR   --   --   --  1.0   APTT seconds  --   --  27.1  --      Results from last 7 days   Lab Units 01/24/20  0742   CHOLESTEROL mg/dL 196   TRIGLYCERIDES mg/dL 218*   HDL CHOL mg/dL 32*     Results from last 7 days   Lab Units 01/24/20  0742 01/23/20  1738 01/23/20  1627   HEMOGLOBIN A1C % 5.60  --   --    TROPONIN T ng/mL  --   --  <0.010   LACTATE mmol/L  --  1.8  --        Brief Urine Lab Results     None          Microbiology Results Abnormal     Procedure Component Value - Date/Time    Respiratory Panel, PCR - Swab, Nasopharynx [[id number]]  (Normal) Collected:  01/23/20 1924    Lab Status:  Final result Specimen:  Swab from Nasopharynx Updated:  01/23/20 2111     ADENOVIRUS, PCR Not Detected     Coronavirus 229E Not Detected     Coronavirus HKU1 Not Detected     Coronavirus NL63 Not Detected     Coronavirus OC43 Not Detected     Human Metapneumovirus Not Detected     Human Rhinovirus/Enterovirus Not Detected     Influenza B PCR Not Detected     Parainfluenza Virus 1 Not Detected     Parainfluenza Virus 2 Not Detected     Parainfluenza Virus 3 Not Detected     Parainfluenza Virus 4 Not Detected     Bordetella pertussis pcr Not Detected     Influenza A H1 2009 PCR Not Detected     Chlamydophila pneumoniae PCR Not Detected     Mycoplasma pneumo by PCR Not Detected     Influenza A PCR Not Detected     Influenza A H3 Not Detected     Influenza A H1 Not Detected     RSV, PCR Not Detected     Bordetella parapertussis PCR Not Detected          Imaging Results (All)     Procedure Component Value Units Date/Time    CT Cerebral Perfusion With & Without Contrast [[id number]] Collected:  01/23/20 1631     Updated:  01/24/20 1102    Narrative:       EXAMINATION: CT CEREBRAL PERFUSION WWO CONTRAST - 01/23/2020     INDICATION: TIA. Code Stroke, evaluate for stroke.     TECHNIQUE:  Cerebral perfusion analysis was performed using computed  tomography with contrast administration, including post processing of  parametric maps with determination of cerebral blood flow, cerebral  blood volume, and mean transit time.     The radiation dose reduction device was turned on for each scan per the  ALARA (As Low as Reasonably Achievable) protocol.     COMPARISON: None.     FINDINGS: Perfusion imaging of the brain demonstrates no evidence for  increased mean transit time, decreased cerebral blood flow, or time to  drain/T-max abnormality to suggest large territory reversible ischemic  change. Rapid analysis of the brain demonstrates no evidence of  mismatched volume.       Impression:       No perfusion defect to suggest reversible ischemia or core  infarct. Consider follow-up MRI imaging of the brain for detailed  evaluation, particularly for small vessel ischemic change.     DICTATED:   01/23/2020  EDITED/ls :   01/23/2020      This report was finalized on 1/24/2020 10:59 AM by Dr. Ray Whipple MD.       CT Angiogram Head [[id number]] Collected:  01/23/20 1633     Updated:  01/24/20 1042    Narrative:       EXAMINATION: CT ANGIOGRAM HEAD, CT ANGIOGRAM NECK - 01/23/2020      INDICATION: Right-sided weakness. Evaluate for stroke. Stroke protocol.     TECHNIQUE: Unenhanced CT imaging of the head and neck was performed  followed by dedicated CTA imaging of the head and neck. With additional  multiplanar reformatted 3D reconstructed MIP and VRT imaging of the  vasculature performed on a separate workstation and submitted for  review.     Stenosis measurement was performed by the NASCET or similar method.     The radiation dose reduction device was turned on for each scan per the  ALARA (As Low as Reasonably Achievable) protocol.     COMPARISON: None.     FINDINGS:      CTA: The visualized aortic arch and pulmonary artery are unrevealing.  The great vessels appear patent as visualized. The common  carotid  arteries are patent. The bilateral carotid bifurcations are patent. The  external carotid arteries appear patent. The bilateral internal carotid  arteries are patent throughout their cavernous, cervical, clinoid, or  supraclinoid portions. The middle cerebral arteries are patent  bilaterally with expected arborization. The anterior cerebral arteries  remain patent. The posterior cerebral arteries remain patent. The  basilar artery is patent. The vertebral arteries remain patent  throughout their course.     ADDITIONAL FINDINGS: The visualized upper lungs are unrevealing. The  surrounding neck soft tissues do not reveal acute abnormality. No  jugular thrombus identified. No mass within the neck. The thyroid gland  is unremarkable. Visualized upper breast soft tissues do not reveal  abnormality. Degenerative changes to the cervical spine are identified.       Impression:       No evidence for hemodynamically significant stenosis or  occlusion of the head or neck vasculature.     Nonspecific prominent anterior posterior chain lymph nodes identified,  the largest within the posterior superior lymph node chain measuring up  to 7 mm in short axis. Correlate with physical exam.     DICTATED:   01/23/2020  EDITED/ls :   01/23/2020      This report was finalized on 1/24/2020 10:39 AM by Dr. Ray Whipple MD.       CT Angiogram Neck [[id number]] Collected:  01/23/20 1633     Updated:  01/24/20 1042    Narrative:       EXAMINATION: CT ANGIOGRAM HEAD, CT ANGIOGRAM NECK - 01/23/2020      INDICATION: Right-sided weakness. Evaluate for stroke. Stroke protocol.     TECHNIQUE: Unenhanced CT imaging of the head and neck was performed  followed by dedicated CTA imaging of the head and neck. With additional  multiplanar reformatted 3D reconstructed MIP and VRT imaging of the  vasculature performed on a separate workstation and submitted for  review.     Stenosis measurement was performed by the NASCET or similar method.      The radiation dose reduction device was turned on for each scan per the  ALARA (As Low as Reasonably Achievable) protocol.     COMPARISON: None.     FINDINGS:      CTA: The visualized aortic arch and pulmonary artery are unrevealing.  The great vessels appear patent as visualized. The common carotid  arteries are patent. The bilateral carotid bifurcations are patent. The  external carotid arteries appear patent. The bilateral internal carotid  arteries are patent throughout their cavernous, cervical, clinoid, or  supraclinoid portions. The middle cerebral arteries are patent  bilaterally with expected arborization. The anterior cerebral arteries  remain patent. The posterior cerebral arteries remain patent. The  basilar artery is patent. The vertebral arteries remain patent  throughout their course.     ADDITIONAL FINDINGS: The visualized upper lungs are unrevealing. The  surrounding neck soft tissues do not reveal acute abnormality. No  jugular thrombus identified. No mass within the neck. The thyroid gland  is unremarkable. Visualized upper breast soft tissues do not reveal  abnormality. Degenerative changes to the cervical spine are identified.       Impression:       No evidence for hemodynamically significant stenosis or  occlusion of the head or neck vasculature.     Nonspecific prominent anterior posterior chain lymph nodes identified,  the largest within the posterior superior lymph node chain measuring up  to 7 mm in short axis. Correlate with physical exam.     DICTATED:   01/23/2020  EDITED/ls :   01/23/2020      This report was finalized on 1/24/2020 10:39 AM by Dr. Ray Whipple MD.       CT Head Without Contrast Stroke Protocol [[id number]] Collected:  01/23/20 1615     Updated:  01/24/20 1042    Narrative:       EXAMINATION: CT HEAD WO CONTRAST - 01/23/2020     INDICATION: Evaluate for stroke. Stroke protocol.     TECHNIQUE: Unenhanced CT imaging of the head was performed during a code  stroke  activation.     The radiation dose reduction device was turned on for each scan per the  ALARA (As Low as Reasonably Achievable) protocol.     COMPARISON: None.     FINDINGS: Unenhanced CT imaging of the head was performed which  demonstrates no evidence of midline shift or mass effect. No evidence of  hydrocephalus appreciated. There is no convincing evidence for  intracranial hemorrhage. The gray-white matter junction appears  maintained without convincing CT evidence for acute transcortical  infarct. Calvarium appears intact without CT evidence for depressed  skull fracture. Paranasal sinuses are predominantly clear. The  surrounding soft tissues are unrevealing. The globes and retro-orbital  soft tissues do not reveal acute abnormality.     These findings were made immediately available to the stroke team/Dr. Antonio at 4:06 PM on 1/23/2020.     DICTATED:   01/23/2020  EDITED/ls :   01/23/2020        Impression:       No acute intracranial process is appreciated.        This report was finalized on 1/24/2020 10:38 AM by Dr. Ray Whipple MD.       MRI Brain Without Contrast [[id number]] Collected:  01/24/20 0212     Updated:  01/24/20 0910    Narrative:       MRI Brain WO     INDICATION:    49-year-old female with hypertension and altered mental status. Convulsions. Weakness. Possible seizure-like activity.    Brain MRI:  No hemorrhage. No acute stroke. No mass lesion. No significant hydrocephalus.    This is a preliminary wet read by Dr. David Stubbs at 0208 hours. Final interpretation will be provided by neuroradiology. Please refer to final interpretation for detailed findings and any further recommendations.     Technique: Multiplanar imaging of the brain was performed without contrast    FINDINGS: I concur with the above primary report. No acute findings. No white matter signal abnormalities are seen. The temporal lobes are symmetric. No mass lesions are noted. Extra-axial structures are unremarkable.  Major dural venous sinuses appear to  be patent. No definite focus for seizure activity is identified.      Impression:       Negative brain MRI. No acute findings.    Final report dictated on 1/24/2020 9:08 AM    Signer Name: Doron Hinojosa MD   Signed: 1/24/2020 9:08 AM   Workstation Name: [id number]    [facility]    XR Chest 1 View [[id number]] Collected:  01/23/20 1714     Updated:  01/23/20 1817    Narrative:          EXAMINATION: XR CHEST 1 VW - 01/23/2020     INDICATION: Right-sided numbness. Left facial droop. Evaluate for  stroke. Stroke protocol.     COMPARISON: None.     FINDINGS: Cardiac silhouette is normal. There is no pulmonary  inflammatory process, mass or effusion.           Impression:       No active disease.     DICTATED:   01/23/2020  EDITED/ls :   01/23/2020      This report was finalized on 1/23/2020 6:14 PM by Dr. Chapincito Jorgensen MD.                       Results for orders placed during the hospital encounter of 01/23/20   Adult Transthoracic Echo Complete W/ Cont if Necessary Per Protocol (With Agitated Saline)    Narrative · Left ventricular systolic function is normal.  · Estimated EF appears to be in the range of 56 - 60%  · Left ventricular wall thickness is consistent with borderline concentric   hypertrophy.  · Saline test results are negative for right to left atrial level shunt.          Plan for Follow-up of Pending Labs/Results:     Discharge Details        Discharge Medications      New Medications      Instructions Start Date   aspirin 81 MG chewable tablet   81 mg, Oral, Daily   Start Date:  January 25, 2020        Continue These Medications      Instructions Start Date   atenolol 100 MG tablet  Commonly known as:  TENORMIN   100 mg, Oral, 2 Times Daily      clonazePAM 1 MG tablet  Commonly known as:  KlonoPIN   1 mg, Oral, Nightly      HYDROcodone-acetaminophen  MG per tablet  Commonly known as:  NORCO   1 tablet, Oral, As Needed      lisinopril 10  MG tablet  Commonly known as:  PRINIVIL,ZESTRIL   10 mg, Oral, Nightly      TiZANidine 6 MG capsule  Commonly known as:  ZANAFLEX   6 mg, Oral, 2 Times Daily      venlafaxine 75 MG tablet  Commonly known as:  EFFEXOR   75 mg, Oral, Nightly      zolpidem 5 MG tablet  Commonly known as:  AMBIEN   5 mg, Oral, Nightly             No Known Allergies      Discharge Disposition:  Home or Self Care    Diet:  Hospital:  Diet Order   Procedures   • Diet Regular; Cardiac     Restrictions or Other Recommendations:  Do not drive for 90 days       CODE STATUS:    Code Status and Medical Interventions:   Ordered at: 01/23/20 1953     Level Of Support Discussed With:    Patient     Code Status:    CPR     Medical Interventions (Level of Support Prior to Arrest):    Full       No future appointments.        Time Spent on Discharge:  35 minutes    Electronically signed by Kiarra Castro MD, 01/24/20, 3:21 PM.

## 2020-01-24 NOTE — THERAPY DISCHARGE NOTE
Patient Name: Maru Goncalves  : 1970    MRN: [id number]                              Today's Date: 2020       Admit Date: 2020    Visit Dx:     ICD-10-CM ICD-9-CM   1. New onset seizure with abnormal neurological exam without head trauma (CMS/HCC) R56.9 780.39    R29.90 781.99   2. Right sided weakness R53.1 728.87   3. Acute viral syndrome B34.9 079.99   4. Elevated blood pressure reading with diagnosis of hypertension I10 401.9     Patient Active Problem List   Diagnosis   • Tobacco abuse   • Upper respiratory infection   • CVA (cerebral vascular accident) (CMS/HCC)   • Seizure-like activity (CMS/HCC)   • New onset seizure with abnormal neurological exam without head trauma (CMS/HCC)     Past Medical History:   Diagnosis Date   • Hypertension      Past Surgical History:   Procedure Laterality Date   • CHOLECYSTECTOMY     • HYSTERECTOMY      PARTIAL   • TUBAL ABDOMINAL LIGATION       General Information     Row Name 20 1326          PT Evaluation Time/Intention    Document Type  discharge evaluation/summary  -CD     Mode of Treatment  physical therapy  -CD     Row Name 20 1326          General Information    Patient Profile Reviewed?  yes  -CD     Prior Level of Function  independent:;community mobility;all household mobility;bed mobility;ADL's;driving  -CD     Existing Precautions/Restrictions  no known precautions/restrictions  -CD     Row Name 20 1326          Relationship/Environment    Lives With  significant other  -CD     Row Name 20 1326          Resource/Environmental Concerns    Current Living Arrangements  home/apartment/condo  -CD     Row Name 20 1326          Cognitive Assessment/Intervention- PT/OT    Orientation Status (Cognition)  oriented x 3  -CD     Cognitive Assessment/Intervention Comment  FOLLOWS ALL COMMANDS. ABLE TO RECALL 3/3 WORDS AFTER 5 MINUTES WITH SOME DIFFICULTY AND INCREASED TIME   -CD       User Key  (r) = Recorded By, (t) = Taken By,  (c) = Cosigned By    Initials Name Provider Type    Lisa Guzmán, PT Physical Therapist        Mobility     Row Name 01/24/20 1329          Bed Mobility Assessment/Treatment    Bed Mobility Assessment/Treatment  supine-sit  -CD     Supine-Sit [place] (Bed Mobility)  independent  -CD     Row Name 01/24/20 1329          Sit-Stand Transfer    Sit-Stand [place] (Transfers)  independent  -CD     Row Name 01/24/20 1329          Gait/Stairs Assessment/Training    Gait/Stairs Assessment/Training  gait/ambulation independence  -CD     [place] Level (Gait)  independent  -CD     Distance in Feet (Gait)  500  -CD     Comment (Gait/Stairs)  NO LOB OR DIFFICULTY WITH BACKWARDS GAIT, TURNING 360 FEET, OR RETRIEVING ITEM FROM FLOOR.   -CD       User Key  (r) = Recorded By, (t) = Taken By, (c) = Cosigned By    Initials Name Provider Type    Lisa Guzmán PT Physical Therapist        Obj/Interventions     Row Name 01/24/20 1330          General ROM    GENERAL ROM COMMENTS  B LE AROM WFL'S   -CD     Row Name 01/24/20 1330          MMT (Manual Muscle Testing)    General MMT Comments  GROSSLY 5/5 AND SYMMETRICAL.   -CD     Row Name 01/24/20 1330          Static Sitting Balance    Level of [place] (Unsupported Sitting, Static Balance)  independent  -CD     Row Name 01/24/20 1330          Dynamic Sitting Balance    Level of [place], Reaches Outside Midline (Sitting, Dynamic Balance)  independent  -CD     Row Name 01/24/20 1330          Static Standing Balance    Level of [place] (Supported Standing, Static Balance)  independent  -CD     Row Name 01/24/20 1330          Dynamic Standing Balance    Level of [place], Reaches Outside Midline (Standing, Dynamic Balance)  independent  -CD     Comment, Reaches Outside Midline (Standing, Dynamic Balance)  SEE GAIT NOTE.   -CD       User Key  (r) = Recorded By, (t) = Taken By, (c) = Cosigned By    Initials Name Provider Type    Lisa Guzmán, PT  Physical Therapist        Goals/Plan    No documentation.       Clinical Impression     Row Name 01/24/20 1333          Pain Assessment    Additional Documentation  Pain Scale: Numbers Pre/Post-Treatment (Group)  -CD     Row Name 01/24/20 1331          Pain Scale: Numbers Pre/Post-Treatment    Pain Scale: Numbers, Pretreatment  1/10  -CD     Pain Scale: Numbers, Post-Treatment  1/10  -CD     Pain Location  head  -CD     Pre/Post Treatment Pain Comment  MILD HA- TOLERATED GAIT IN [place].   -CD     Pain Intervention(s)  Repositioned  -CD     Row Name 01/24/20 1331          Plan of Care Review    Plan of Care Reviewed With  patient;family  -CD     Outcome Summary  GOALS NOT ESTABLISHED AS PT IS AT BASELINE WITH FUNCTIONAL MOBILITY. NO DEFICITS IN BALANCE, STRENGTH OR FUNCTIONAL MOBILITY. RECOMMEND D/C HOME. PT C/O MILD HA BUT STATES OTHERWISE SYMPTOMS HAVE RESOLVED.   -CD     Row Name 01/24/20 1331          Physical Therapy Clinical Impression    Patient/Family Goals Statement (PT Clinical Impression)  TO GO HOME.   -CD     Criteria for Skilled Interventions Met (PT Clinical Impression)  no;no problems identified which require skilled intervention  -CD     Row Name 01/24/20 1331          Vital Signs    Pre Systolic BP Rehab  142  -CD     Pre Treatment Diastolic BP  81  -CD     Post Systolic BP Rehab  178  -CD     Post Treatment Diastolic BP  84  -CD     Pretreatment Heart Rate (beats/min)  54  -CD     Posttreatment Heart Rate (beats/min)  62  -CD     Pre Patient Position  Supine  -CD     Intra Patient Position  Standing  -CD     Post Patient Position  Sitting  -CD     Row Name 01/24/20 1331          Positioning and Restraints    Pre-Treatment Position  in bed  -CD     Post Treatment Position  chair  -CD     In Chair  reclined;call light within reach;legs elevated NSG NOTIFIED PT IS SAFE TO BE UP AD [place]VIRA.   -CD       User Key  (r) = Recorded By, (t) = Taken By, (c) = Cosigned By    Initials Name Provider Type    VIANCA Lunsford  Lisa HUDSON PT Physical Therapist        Outcome Measures     Row Name 01/24/20 1336          How much help from another person do you currently need...    Turning from your back to your side while in flat bed without using bedrails?  4  -CD     Moving from lying on back to sitting on the side of a flat bed without bedrails?  4  -CD     Moving to and from a bed to a chair (including a wheelchair)?  4  -CD     Standing up from a chair using your arms (e.g., wheelchair, bedside chair)?  4  -CD     Climbing 3-5 steps with a railing?  4  -CD     To walk in hospital room?  4  -CD     AM-PAC 6 Clicks Score (PT)  24  -CD     Row Name 01/24/20 1336          Modified [place] Scale    Pre-Stroke Modified [place] Scale  0 - No Symptoms at all.  -CD     Modified [place] Scale  0 - No Symptoms at all.  -CD     Row Name 01/24/20 1336          Functional Assessment    Outcome Measure Options  AM-PAC 6 Clicks Basic Mobility (PT);Modified [place]  -CD       User Key  (r) = Recorded By, (t) = Taken By, (c) = Cosigned By    Initials Name Provider Type    CD Lisa Lunsford PT Physical Therapist        Physical Therapy Education                 Title: PT OT SLP Therapies (Done)     Topic: Physical Therapy (Done)     Point: Mobility training (Done)     Description:   Instruct learner(s) on safety and technique for assisting patient out of bed, chair or wheelchair.  Instruct in the proper use of assistive devices, such as walker, crutches, cane or brace.              Patient Friendly Description:   It's important to get you on your feet again, but we need to do so in a way that is safe for you. Falling has serious consequences, and your personal safety is the most important thing of all.        When it's time to get out of bed, one of us or a family member will sit next to you on the bed to give you support.     If your doctor or nurse tells you to use a walker, crutches, a cane, or a brace, be sure you use it every time you get out of bed,  even if you think you don't need it.    Learning Progress Summary           Patient Acceptance, E, VU by CD at 1/24/2020 1337    Comment:  S&S GHADA F.A.S.T.   Family Acceptance, E, VU by CD at 1/24/2020 1337    Comment:  TOYIN HRAT CVAA.S.T.                   Point: Home exercise program (Done)     Description:   Instruct learner(s) on appropriate technique for monitoring, assisting and/or progressing patient with therapeutic exercises and activities.              Learning Progress Summary           Patient Acceptance, E, VU by CD at 1/24/2020 1337    Comment:  S&S GHADA F.A.S.TShawanda   Family Acceptance, E, VU by CD at 1/24/2020 1337    Comment:  S&EARL URRUTIA F.A.S.T.                   Point: Body mechanics (Done)     Description:   Instruct learner(s) on proper positioning and spine alignment for patient and/or caregiver during mobility tasks and/or exercises.              Learning Progress Summary           Patient Acceptance, E, VU by CD at 1/24/2020 1337    Comment:  S&S GHADA F.A.S.T.   Family Acceptance, E, VU by CD at 1/24/2020 1337    Comment:  S&S SAHIL URRUTIA.A.S.T.                   Point: Precautions (Done)     Description:   Instruct learner(s) on prescribed precautions during mobility and gait tasks              Learning Progress Summary           Patient Acceptance, E, VU by CD at 1/24/2020 1337    Comment:  S&S GHADA F.A.S.T.   Family Acceptance, E, VU by CD at 1/24/2020 1337    Comment:  S&S GHADA F.A.S.T.                               User Key     Initials Effective Dates Name Provider Type Discipline    CD 06/19/15 -  Lisa Lunsford, PT Physical Therapist PT              PT Recommendation and Plan     Outcome Summary/Treatment Plan (PT)  Anticipated Discharge Disposition (PT): home  Plan of Care Reviewed With: patient, family  Outcome Summary: GOALS NOT ESTABLISHED AS PT IS AT BASELINE WITH FUNCTIONAL MOBILITY. NO DEFICITS IN BALANCE, STRENGTH OR FUNCTIONAL MOBILITY. RECOMMEND D/C HOME. PT C/O MILD HA BUT STATES  OTHERWISE SYMPTOMS HAVE RESOLVED.      Time Calculation:   PT Charges     Row Name 01/24/20 1339             Time Calculation    Start Time  1130  -CD      PT Received On  01/24/20  -CD        User Key  (r) = Recorded By, (t) = Taken By, (c) = Cosigned By    Initials Name Provider Type    CD Lisa Lunsford, PT Physical Therapist        Therapy Charges for Today     Code Description Service Date Service Provider Modifiers Qty    12441163281 HC PT EVAL MOD COMPLEXITY 4 1/24/2020 Lisa Lunsford, PT GP 1          PT G-Codes  Outcome Measure Options: AM-PAC 6 Clicks Basic Mobility (PT), Modified [place]  AM-PAC 6 Clicks Score (PT): 24  Modified [place] Scale: 0 - No Symptoms at all.    PT Discharge Summary  Anticipated Discharge Disposition (PT): home    Lisa Lunsford, PT  1/24/2020

## 2020-01-24 NOTE — CONSULTS
"    Referring Provider: Dr Castro    Reason for Consultation: Rule out stroke, right-sided weakness and aphasia, seizure    Patient Care Team:  Leanna Campbell APRN as PCP - General (Nurse Practitioner)    Chief complaint Right-sided weakness, aphasia, seizure      Subjective .     History of present illness: 49-yo woman with PMH notable for HTN, tobacco use admitted yesterday after she was flown from [place] for rule out stroke.  She was last known normal yesterday morning and woke up with baseline mentation but began to feel \"off\" through the morning.  She noted tingling in her right side off and on and developed a global headache.  She apparently got dressed for an appointment later but has no memory of this but does recall sitting down in a recliner and the next thing she recalls is being in the ambulance.  During the time she was home before discovery, she attempted to text a family member, but the message was reportedly jumbled and incomprehensible.  Family member per went to check on her and found her with diminished responsiveness, sitting in a chair, able to  but not hold her arms up when lifted passively, and asymmetry of her face, with mumbling incomprehensible speech. EMS was called and reported patient had flaccid right side and was aphasic.  While in the ambulance on the way to the helicopter her  reports he was told that the EMS thought she had a seizure based on her eye movements.  She was given Ativan.  By the time she got to the helicopter he reports that her speech was intelligible.  Systolic blood pressure was 210.   Patient here reported suspected recent upper respiratory virus with fever to 101, productive cough and congestion.  She had been eating and drinking very little.  Reports no new medications, no chest pain, shortness of breath, abdominal pain, N/V/D.  She reports she was diagnosed with a \"mini stroke\" while a ileana in high school after an episode where she woke from a " nap and felt very strange in her right upper extremity was flexed at the elbow and wrist with fingers curled and very tight for a very brief period of time.  She notes that she had the same strange feeling yesterday morning that she recalls from back then.        Review of Systems  Pertinent items are noted in HPI, all other systems reviewed and negative     History  Past Medical History:   Diagnosis Date   • Hypertension    ,   Past Surgical History:   Procedure Laterality Date   • CHOLECYSTECTOMY     • HYSTERECTOMY      PARTIAL   • TUBAL ABDOMINAL LIGATION     , History reviewed. No pertinent family history.,   Social History     Tobacco Use   • Smoking status: Current Every Day Smoker     Packs/day: 0.50     Types: Cigarettes, Electronic Cigarette   Substance Use Topics   • Alcohol use: Never     Frequency: Never   • Drug use: Never   ,   Medications Prior to Admission   Medication Sig Dispense Refill Last Dose   • atenolol (TENORMIN) 100 MG tablet Take 100 mg by mouth 2 (Two) Times a Day.      • clonazePAM (KlonoPIN) 1 MG tablet Take 1 mg by mouth Every Night.      • HYDROcodone-acetaminophen (NORCO)  MG per tablet Take 1 tablet by mouth As Needed for Moderate Pain  (Break in half).      • lisinopril (PRINIVIL,ZESTRIL) 10 MG tablet Take 10 mg by mouth Every Night.      • TiZANidine (ZANAFLEX) 6 MG capsule Take 6 mg by mouth 2 (Two) Times a Day.      • venlafaxine (EFFEXOR) 75 MG tablet Take 75 mg by mouth Every Night.      • zolpidem (AMBIEN) 5 MG tablet Take 5 mg by mouth Every Night.      , Scheduled Meds:    aspirin 81 mg Oral Daily   Or      aspirin 300 mg Rectal Daily   atorvastatin 80 mg Oral Nightly   sodium chloride 10 mL Intravenous Q12H   , Continuous Infusions:    sodium chloride 100 mL/hr Last Rate: 100 mL/hr (01/23/20 0331)   , PRN Meds:  •  acetaminophen **OR** acetaminophen  •  bisacodyl  •  docusate sodium  •  LORazepam  •  ondansetron  •  sodium chloride  •  sodium chloride and Allergies:   "Patient has no known allergies.    Objective     Vital Signs   Blood pressure 142/81, pulse 55, temperature 98.4 °F (36.9 °C), temperature source Oral, resp. rate 18, height 165.1 cm (65\"), weight 90.7 kg (200 lb), SpO2 92 %.    Physical Exam:   Middle-aged white woman in no acute distress sitting up in the hospital bed, alert, fully oriented, with normal language, attention, memory and fund of knowledge.  She has no dysarthria.  Pupils 3 mm and reactive, visual fields full to confrontation, extraocular movements intact, normal facial sensation and movement, hearing intact to voice, palate shoulders elevates symmetrically and tongue protrudes midline  Motor 5/5 throughout with no pronator drift  Coordination intact in upper and lower extremities  Tone normal  Light touch symmetric throughout  Reflexes 2+ in the upper extremities, 1+ at the knees and trace at the ankles, withdraws from plantar stim  Neck supple, no carotid bruit appreciated  Heart RRR no murmur appreciated  Lungs clear to auscultation, normal respiratory effort  Abdomen soft, obese, NT, ND with positive bowel sounds  Skin warm and dry  Extremities without cyanosis or edema      Results Review:   I reviewed the patient's new clinical results.  I reviewed the patient's new imaging results and agree with the interpretation.  I reviewed the patient's other test results and agree with the interpretation  Brain MRI, CTA of head and neck and CT perfusion images all reviewed, agree unremarkable with no stenoses/ischemia  Labs reviewed  Total cholesterol 196 triglycerides 218 HDL 32   CBC unremarkable  CMP with glucose 102 ALT 36 AST 41 otherwise normal  Respiratory panel PCR from nasopharynx negative  Echo final report pending, but preliminary unremarkable with negative saline, normal left atrial size and volume, unremarkable valves.  EEG normal awake only    Assessment/Plan       CVA (cerebral vascular accident) (CMS/[facility])    Tobacco abuse    Upper " respiratory infection    Seizure-like activity (CMS/HCC)    New onset seizure with abnormal neurological exam without head trauma (CMS/HCC)      Transient alteration of awareness, inability to speak, focal right-sided symptoms-doubt stroke given the symptoms lasted for a prolonged period with MRI normal, CT perfusion and CTA unremarkable as well.  Cannot absolutely rule out TIA and reasonable to continue aspirin.    Suspect seizure more likely etiology of all symptoms, given features as described, probable seizure witnessed by EMS, as well as possible seizure 30+ years ago.  However, with normal MRI and EEG, uncertainty regarding diagnosis, would not recommend starting an anticonvulsant.  Would strongly however recommend that patient follow-up in neurology clinic and I discussed this with her and her .  Discussed safety issues regarding loss of consciousness and possible seizure.  Recommend no driving for 90 days given unexplained alteration of awareness.    I discussed the patients findings and my recommendations with patient and family    Diane Centeno MD  01/24/20  9:56 AM

## 2020-01-24 NOTE — H&P
"    [facility] Medicine Services  HISTORY AND PHYSICAL    Patient Name: Maru Goncalves  : 1970  MRN: [id number]  Primary Care Physician: Leanna Campbell APRN  Date of admission: 2020      Subjective   Subjective     Chief Complaint:  AMS    HPI:  Maru Goncalves is a 49 y.o. female with a past medical history significant for hypertension and ongoing tobacco abuse who presents with AMS. Last known normal this morning. Patient reports waking up at baseline mentation then progressively feeling \"off\" throughout the morning. Patient first noticed a global headache then weakness in right upper and lower extremity. Speech difficulty started soon after. She states when she got to the point of ataxia, she tried texting her family member. Family member states her messages where jumbled and incomprehensible. She called the patient and noticed speech abnormalities. States she was concerned and went to check on her. Upon arrival patient lethargic and unresponsive. Eyes were \"rolling back into her head\" and she was unable to stand. EMS was called. They report patient was flaccid on the right side and aphasic. Blood pressure elevated with systolic of 210. She was subsequently flown from [place] to [facility] ED. EMS reports \"seizure like activity\" en route. Was administered 5 mg ativan.     There are no complaints of syncope, chest pain, SOB, abdominal pain, or N/V/D. She has no history of CVA/TIA or seizures. Now new medications. Patient does however report being sick with what she suspects for an upper respiratory virus. States she was in [place] for 3 days with fever with T-max 101, productive cough, and congestion. She is unsure of sick contacts and did not get the influenza vaccine this year. Patient is currently back to baseline functioning and mental status. However unable to recall most of altered episode. No other complaints at this time. Will admit for further evaluation and " ----- Message from Miguel Marc MD sent at 11/15/2017  7:52 PM CST -----  Add b12 and folic acid   treatment.    Emergency Department Evaluation: hypertensive to 182/69. Vitals otherwise stable. AST 43. ALT 35. Chemistry and hematology otherwise favorable. CXR clear. CTA head and neck unremarkable. CT perfusion negative.     Review of Systems   Constitutional: Negative for chills and fever.   HENT: Negative for congestion and trouble swallowing.    Eyes: Negative for photophobia and visual disturbance.   Respiratory: Negative for cough and shortness of breath.    Cardiovascular: Negative for chest pain and leg swelling.   Gastrointestinal: Negative for abdominal pain, diarrhea, nausea and vomiting.   Endocrine: Negative for cold intolerance and heat intolerance.   Genitourinary: Negative for dysuria and flank pain.   Musculoskeletal: Negative for back pain and gait problem.   Skin: Negative for pallor and rash.   Allergic/Immunologic: Negative for immunocompromised state.   Neurological: Positive for seizures, speech difficulty, weakness and headaches.   Hematological: Negative for adenopathy.   Psychiatric/Behavioral: Positive for confusion. Negative for agitation.        All other systems reviewed and are negative.     Personal History     Past Medical History:   Diagnosis Date   • Hypertension        Past Surgical History:   Procedure Laterality Date   • CHOLECYSTECTOMY     • HYSTERECTOMY      PARTIAL   • TUBAL ABDOMINAL LIGATION         Family History: family history is not on file. Otherwise pertinent FHx was reviewed and unremarkable.     Social History:  reports that she has been smoking cigarettes and electronic cigarette. She has been smoking about 0.50 packs per day. She does not have any smokeless tobacco history on file. She reports that she does not drink alcohol or use drugs.  Social History     Social History Narrative   • Not on file       Medications:  Available home medication information reviewed.  No medications prior to admission.       No Known Allergies    Objective   Objective     Vital  Signs:   Temp:  [98.2 °F (36.8 °C)-98.4 °F (36.9 °C)] 98.2 °F (36.8 °C)  Heart Rate:  [58-79] 58  Resp:  [16] 16  BP: (176-182)/(69-91) 180/89   Total (NIH Stroke Scale): 4    Physical Exam   Constitutional: Awake, alert  Eyes: PERRLA, sclerae anicteric, no conjunctival injection  HENT: NCAT, mucous membranes moist  Neck: Supple, no thyromegaly, no lymphadenopathy, trachea midline  Respiratory: Clear to auscultation bilaterally, nonlabored respirations   Cardiovascular: RRR, no murmurs, rubs, or gallops, palpable pedal pulses bilaterally  Gastrointestinal: Positive bowel sounds, soft, nontender, nondistended  Musculoskeletal: No bilateral ankle edema, no clubbing or cyanosis to extremities  Psychiatric: Appropriate affect, cooperative  Neurologic: Oriented x 3, strength symmetric in all extremities, Cranial Nerves grossly intact to confrontation, speech clear  Skin: No rashes      Results Reviewed:  I have personally reviewed current lab and radiology data.    Results from last 7 days   Lab Units 01/23/20  1627  01/23/20  1620   WBC 10*3/mm3 8.86  --   --    HEMOGLOBIN g/dL 15.9  --   --    HEMOGLOBIN, POC   --    < >  --    HEMATOCRIT % 47.7*  --   --    HEMATOCRIT POC   --    < >  --    PLATELETS 10*3/mm3 285  --   --    INR   --   --  1.0    < > = values in this interval not displayed.     Results from last 7 days   Lab Units 01/23/20  1738 01/23/20  1627 01/23/20  1621   CREATININE mg/dL  --   --  0.90   ALT (SGPT) U/L  --  35*  --    AST (SGOT) U/L  --  43*  --    TROPONIN T ng/mL  --  <0.010  --    LACTATE mmol/L 1.8  --   --      Estimated Creatinine Clearance: 83.2 mL/min (by C-G formula based on SCr of 0.9 mg/dL).  Brief Urine Lab Results     None        Imaging Results (Last 24 Hours)     Procedure Component Value Units Date/Time    CT Angiogram Head [[id number]] Collected:  01/23/20 1633     Updated:  01/23/20 1921    Narrative:       EXAMINATION: CT ANGIOGRAM HEAD, CT ANGIOGRAM NECK - 01/23/2020       INDICATION: Right-sided weakness. Evaluate for stroke. Stroke protocol.     TECHNIQUE: Unenhanced CT imaging of the head and neck was performed  followed by dedicated CTA imaging of the head and neck. With additional  multiplanar reformatted 3D reconstructed MIP and VRT imaging of the  vasculature performed on a separate workstation and submitted for  review.     Stenosis measurement was performed by the NASCET or similar method.     The radiation dose reduction device was turned on for each scan per the  ALARA (As Low as Reasonably Achievable) protocol.     COMPARISON: None.     FINDINGS:      CTA: The visualized aortic arch and pulmonary artery are unrevealing.  The great vessels appear patent as visualized. The common carotid  arteries are patent. The bilateral carotid bifurcations are patent. The  external carotid arteries appear patent. The bilateral internal carotid  arteries are patent throughout their cavernous, cervical, clinoid, or  supraclinoid portions. The middle cerebral arteries are patent  bilaterally with expected arborization. The anterior cerebral arteries  remain patent. The posterior cerebral arteries remain patent. The  basilar artery is patent. The vertebral arteries remain patent  throughout their course.     ADDITIONAL FINDINGS: The visualized upper lungs are unrevealing. The  surrounding neck soft tissues do not reveal acute abnormality. No  jugular thrombus identified. No mass within the neck. The thyroid gland  is unremarkable. Visualized upper breast soft tissues do not reveal  abnormality. Degenerative changes to the cervical spine are identified.       Impression:       No evidence for hemodynamically significant stenosis or  occlusion of the head or neck vasculature.     Nonspecific prominent anterior posterior chain lymph nodes identified,  the largest within the posterior superior lymph node chain measuring up  to 7 mm in short axis. Correlate with physical exam.     DICTATED:    01/23/2020  EDITED/ls :   01/23/2020        CT Angiogram Neck [[id number]] Collected:  01/23/20 1633     Updated:  01/23/20 1920    Narrative:       EXAMINATION: CT ANGIOGRAM HEAD, CT ANGIOGRAM NECK - 01/23/2020      INDICATION: Right-sided weakness. Evaluate for stroke. Stroke protocol.     TECHNIQUE: Unenhanced CT imaging of the head and neck was performed  followed by dedicated CTA imaging of the head and neck. With additional  multiplanar reformatted 3D reconstructed MIP and VRT imaging of the  vasculature performed on a separate workstation and submitted for  review.     Stenosis measurement was performed by the NASCET or similar method.     The radiation dose reduction device was turned on for each scan per the  ALARA (As Low as Reasonably Achievable) protocol.     COMPARISON: None.     FINDINGS:      CTA: The visualized aortic arch and pulmonary artery are unrevealing.  The great vessels appear patent as visualized. The common carotid  arteries are patent. The bilateral carotid bifurcations are patent. The  external carotid arteries appear patent. The bilateral internal carotid  arteries are patent throughout their cavernous, cervical, clinoid, or  supraclinoid portions. The middle cerebral arteries are patent  bilaterally with expected arborization. The anterior cerebral arteries  remain patent. The posterior cerebral arteries remain patent. The  basilar artery is patent. The vertebral arteries remain patent  throughout their course.     ADDITIONAL FINDINGS: The visualized upper lungs are unrevealing. The  surrounding neck soft tissues do not reveal acute abnormality. No  jugular thrombus identified. No mass within the neck. The thyroid gland  is unremarkable. Visualized upper breast soft tissues do not reveal  abnormality. Degenerative changes to the cervical spine are identified.       Impression:       No evidence for hemodynamically significant stenosis or  occlusion of the head or neck vasculature.      Nonspecific prominent anterior posterior chain lymph nodes identified,  the largest within the posterior superior lymph node chain measuring up  to 7 mm in short axis. Correlate with physical exam.     DICTATED:   01/23/2020  EDITED/ls :   01/23/2020        CT Cerebral Perfusion With & Without Contrast [[id number]] Collected:  01/23/20 1631     Updated:  01/23/20 1915    Narrative:       EXAMINATION: CT CEREBRAL PERFUSION WWO CONTRAST - 01/23/2020     INDICATION: TIA. Code Stroke, evaluate for stroke.     TECHNIQUE: Cerebral perfusion analysis was performed using computed  tomography with contrast administration, including post processing of  parametric maps with determination of cerebral blood flow, cerebral  blood volume, and mean transit time.     The radiation dose reduction device was turned on for each scan per the  ALARA (As Low as Reasonably Achievable) protocol.     COMPARISON: None.     FINDINGS: Perfusion imaging of the brain demonstrates no evidence for  increased mean transit time, decreased cerebral blood flow, or time to  drain/T-max abnormality to suggest large territory reversible ischemic  change. Rapid analysis of the brain demonstrates no evidence of  mismatched volume.       Impression:       No perfusion defect to suggest reversible ischemia or core  infarct. Consider follow-up MRI imaging of the brain for detailed  evaluation, particularly for small vessel ischemic change.     DICTATED:   01/23/2020  EDITED/ls :   01/23/2020        CT Head Without Contrast Stroke Protocol [[id number]] Collected:  01/23/20 1615     Updated:  01/23/20 1911    Narrative:       EXAMINATION: CT HEAD WO CONTRAST - 01/23/2020     INDICATION: Evaluate for stroke. Stroke protocol.     TECHNIQUE: Unenhanced CT imaging of the head was performed during a code  stroke activation.     The radiation dose reduction device was turned on for each scan per the  ALARA (As Low as Reasonably Achievable) protocol.     COMPARISON:  None.     FINDINGS: Unenhanced CT imaging of the head was performed which  demonstrates no evidence of midline shift or mass effect. No evidence of  hydrocephalus appreciated. There is no convincing evidence for  intracranial hemorrhage. The gray-white matter junction appears  maintained without convincing CT evidence for acute transcortical  infarct. Calvarium appears intact without CT evidence for depressed  skull fracture. Paranasal sinuses are predominantly clear. The  surrounding soft tissues are unrevealing. The globes and retro-orbital  soft tissues do not reveal acute abnormality.     These findings were made immediately available to the stroke team/Dr. Antonio at 4:06 PM on 1/23/2020.     DICTATED:   01/23/2020  EDITED/ls :   01/23/2020        Impression:       No acute intracranial process is appreciated.          XR Chest 1 View [[id number]] Collected:  01/23/20 1714     Updated:  01/23/20 1817    Narrative:          EXAMINATION: XR CHEST 1 VW - 01/23/2020     INDICATION: Right-sided numbness. Left facial droop. Evaluate for  stroke. Stroke protocol.     COMPARISON: None.     FINDINGS: Cardiac silhouette is normal. There is no pulmonary  inflammatory process, mass or effusion.           Impression:       No active disease.     DICTATED:   01/23/2020  EDITED/ls :   01/23/2020      This report was finalized on 1/23/2020 6:14 PM by Dr. Chapincito Jorgensen MD.                Assessment/Plan   Assessment & Plan     Active Hospital Problems    Diagnosis POA   • **CVA (cerebral vascular accident) (CMS/HCC) [I63.9] Yes     Priority: High   • Upper respiratory infection [J06.9] Yes     Priority: High   • Seizure-like activity (CMS/HCC) [R56.9] Yes     Priority: High   • Tobacco abuse [Z72.0] Yes     Priority: Low   • New onset seizure with abnormal neurological exam without head trauma (CMS/HCC) [R56.9, R29.90] Yes     49 year old female smoker with history of HTN flown in from [place] with AMS exhibiting seizure and  stroke like activity. Imaging of head negative. Obtain MRI, EEG. Neurology to see in am      1. CVA:  - start ASA daily. Add high intensity statin.  - awaiting MRI brain. For now hold antihypertensives and allow permissive hypertension  - check lipid panel, A1c, TSH  - obtain echocardiogram  - PT/OT/SLP and neurology toe evaluate in am  - am labs    2. Upper respiratory infection:  - afebrile without white count. CXR clear.  - smoker  - add duo nebs, mucinex as needed  - respiratory PCR    3. Seizure like activity:  - seizure precautions  - EEG in am    4. Tobacco abuse:  - smokes 1/2 PPD. Nicotine patch as needed. Tobacco Cessation Counselin minutes of tobacco cessation counseling provided, including but not limited to, risks of ongoing tobacco use, pertinence to current or future health status and availability/examples of cessation resources.  Patient expresses desire to quit.        DVT prophylaxis: mechanical    CODE STATUS:  Full code  Code Status and Medical Interventions:   Ordered at: 20 1953     Level Of Support Discussed With:    Patient     Code Status:    CPR     Medical Interventions (Level of Support Prior to Arrest):    Full       Admission Status:  I believe this patient meets INPATIENT status due to weakness and seizure activity requiring further evaluation with EEG and expert consultation with Neurology.  I feel patient’s risk for adverse outcomes and need for care warrant INPATIENT evaluation and I predict the patient’s care encounter to likely last beyond 2 midnights.      Electronically signed by Jaylen Daugherty PA-C, 20, 8:24 PM.    Attending   Admission Attestation       I have seen and examined the patient, performing an independent face-to-face diagnostic evaluation with plan of care reviewed and developed with the advanced practice clinician (APC).      Brief Summary Statement:   Maru Goncalves is a 49 y.o. female with a past medical history significant for hypertension  "and ongoing tobacco abuse who presents with AMS. Last known normal this morning. Patient reports waking up at baseline mentation then progressively feeling \"off\" throughout the morning.  Pt also had some reported seizure activity while en route and given Ativan. Pt also with upper respiratory symptoms as well as fever. Imaging in the ED negative for acute stroke.     Remainder of detailed HPI is as noted by APC and has been reviewed and/or edited by me for completeness.    Attending Physical Exam:  Constitutional: Awake, alert, NAD, answers all questions appropriately   Eyes: PERRLA, sclerae anicteric, no conjunctival injection  HENT: NCAT, mucous membranes moist  Neck: Supple, no thyromegaly, no lymphadenopathy, trachea midline  Respiratory: Clear to auscultation bilaterally, nonlabored respirations   Cardiovascular: RRR, no murmurs, rubs, or gallops, palpable pedal pulses bilaterally  Gastrointestinal: Positive bowel sounds, soft, nontender, nondistended  Musculoskeletal: No bilateral ankle edema, no clubbing or cyanosis to extremities  Psychiatric: Appropriate affect, cooperative  Neurologic: Oriented x 3, strength symmetric in all extremities, Cranial Nerves grossly intact to confrontation, speech clear  Skin: No rashes    Brief Assessment/Plan :  Pt admitted to the hospitalist service. MRI brain pending. Resp PCR performed and negative. Treat URI symptomatically. Continue ASA/statin. EEG for the AM. Seizure precautions, Neurology to see in AM.  See detailed assessment and plan developed with APC which I have reviewed and/or edited for completeness.    Electronically signed by Gayathri Littlejohn DO, 01/23/20, 11:41 PM.              "

## 2020-01-25 ENCOUNTER — READMISSION MANAGEMENT (OUTPATIENT)
Dept: CALL CENTER | Facility: HOSPITAL | Age: 50
End: 2020-01-25

## 2020-01-25 NOTE — OUTREACH NOTE
Prep Survey      Responses   Facility patient discharged from?  [facility]   Is patient eligible?  Yes   Discharge diagnosis  Possible TIA   Does the patient have one of the following disease processes/diagnoses(primary or secondary)?  Stroke (TIA)   Does the patient have Home health ordered?  No   Is there a DME ordered?  No   Comments regarding appointments  Pt to schedule F/U appointments   Prep survey completed?  Yes          Magi Elise RN

## 2020-01-27 ENCOUNTER — READMISSION MANAGEMENT (OUTPATIENT)
Dept: CALL CENTER | Facility: HOSPITAL | Age: 50
End: 2020-01-27

## 2020-01-27 NOTE — OUTREACH NOTE
Stroke Week 1 Survey      Responses   Facility patient discharged from?  [facility]   Does the patient have one of the following disease processes/diagnoses(primary or secondary)?  Stroke (TIA)   Is there a successful TCM telephone encounter documented?  No   Week 1 attempt successful?  Yes   Call start time  1541   Call end time  1545   Discharge diagnosis  Possible TIA   Meds reviewed with patient/caregiver?  Yes   Is the patient having any side effects they believe may be caused by any medication additions or changes?  No   Does the patient have all medications ordered at discharge?  Yes   Is the patient taking all medications as directed (includes completed medication regime)?  Yes   Does the patient have a primary care provider?   Yes   Does the patient have an appointment with their PCP within 7 days of discharge?  Yes   Has the patient kept scheduled appointments due by today?  Yes   Has home health visited the patient within 72 hours of discharge?  N/A   Psychosocial issues?  No   Does the patient require any assistance with activities of daily living such as eating, bathing, dressing, walking, etc.?  No   Does the patient have any residual symptoms from stroke/TIA?  No   Does the patient understand the diet ordered at discharge?  Yes   Did the patient receive a copy of their discharge instructions?  Yes   Nursing interventions  Reviewed instructions with patient   What is the patient's perception of their health status since discharge?  Improving   Nursing interventions  Nurse provided patient education   Is the patient able to teach back FAST for Stroke?  Yes   Is the patient/caregiver able to teach back the risk factors for a stroke?  High blood pressure-goal below 120/80, High Cholesterol, Carotid or other artery disease   Is the patient/caregiver able to teach back signs and symptoms related to disease process for when to call PCP?  Yes   Is the patient/caregiver able to teach back signs and symptoms  related to disease process for when to call 911?  Yes   Is the patient/caregiver able to teach back the hierarchy of who to call/visit for symptoms/problems? PCP, Specialist, Home health nurse, Urgent Care, ED, 911  Yes   Additional teach back comments  She is doing well, no issues at this time, denies residual.  Has seen PCP.   Week 1 call completed?  Yes          Harleen Hartley RN

## 2020-02-03 ENCOUNTER — READMISSION MANAGEMENT (OUTPATIENT)
Dept: CALL CENTER | Facility: HOSPITAL | Age: 50
End: 2020-02-03

## 2020-02-03 NOTE — OUTREACH NOTE
Stroke Week 2 Survey      Responses   Facility patient discharged from?  [facility]   Does the patient have one of the following disease processes/diagnoses(primary or secondary)?  Stroke (TIA)   Week 2 attempt successful?  No   Unsuccessful attempts  Attempt 1          Lindy Solitario RN

## 2020-02-05 ENCOUNTER — READMISSION MANAGEMENT (OUTPATIENT)
Dept: CALL CENTER | Facility: HOSPITAL | Age: 50
End: 2020-02-05

## 2020-02-05 NOTE — OUTREACH NOTE
Stroke Week 2 Survey      Responses   Facility patient discharged from?  [facility]   Does the patient have one of the following disease processes/diagnoses(primary or secondary)?  Stroke (TIA)   Week 2 attempt successful?  No   Unsuccessful attempts  Attempt 2          Magi Jose RN

## 2020-02-07 ENCOUNTER — READMISSION MANAGEMENT (OUTPATIENT)
Dept: CALL CENTER | Facility: HOSPITAL | Age: 50
End: 2020-02-07

## 2020-02-07 ENCOUNTER — OFFICE VISIT (OUTPATIENT)
Dept: NEUROLOGY | Facility: CLINIC | Age: 50
End: 2020-02-07

## 2020-02-07 VITALS — BODY MASS INDEX: 35.65 KG/M2 | OXYGEN SATURATION: 96 % | WEIGHT: 214 LBS | HEART RATE: 66 BPM | HEIGHT: 65 IN

## 2020-02-07 DIAGNOSIS — R53.1 RIGHT SIDED WEAKNESS: Primary | ICD-10-CM

## 2020-02-07 DIAGNOSIS — R41.89 EPISODE OF UNRESPONSIVENESS: ICD-10-CM

## 2020-02-07 PROCEDURE — 99215 OFFICE O/P EST HI 40 MIN: CPT | Performed by: PHYSICIAN ASSISTANT

## 2020-02-07 RX ORDER — LISINOPRIL 20 MG/1
20 TABLET ORAL DAILY
COMMUNITY
Start: 2020-01-27

## 2020-02-07 RX ORDER — HYDROXYZINE HYDROCHLORIDE 25 MG/1
TABLET, FILM COATED ORAL
COMMUNITY
Start: 2020-01-24

## 2020-02-07 RX ORDER — FUROSEMIDE 20 MG/1
TABLET ORAL
COMMUNITY
Start: 2020-01-24

## 2020-02-07 NOTE — PROGRESS NOTES
"Subjective     Chief Complaint: episode of unresponsiveness      History of Present Illness   Maru Goncalves is a 49 y.o. female who comes to clinic today following a hospitalization at [facility] in 1/20 for an episode of unresponsiveness and right sided weakness. Prior to the spell, she states that she felt 'off' and also noted a diffuse headache as well as paresthesias along the right side of her scalp. Her family came to her home to check on her after receiving jumbled and incomprehensible text messages. At this time, she was minimally responsive and noted to have right sided weakness and aphasia. She is amnestic of this time period. Her systolic BP was noted to be 210. EMS was concerned about a potential seizure en route to the hospital, though the history is a bit unclear concerning this. Workup included an MRI of the brain, CTA of the head and neck, echo, and EEG which were unremarkable. Dr. Centeno was concerned that her symptoms were related to a TIA or potential seizure. Since her hospitalization, she has noted fatigue, though denies any recurrent episodes of unresponsiveness or weakness.     She reports a history of migraines, during which she has left sided paresthesias. She takes Treximet PRN, which is beneficial.     She also reports she was diagnosed with a \"mini stroke\"  in high school after an episode where she woke from a nap and her right upper extremity was flexed at the elbow and wrist with fingers curled and very tight for a very brief period of time. She denies any family history of seizures.       I have reviewed and confirmed the past family, social and medical history as accurate on 2/02/2020.     Review of Systems   Constitutional: Negative.    HENT: Negative.    Eyes: Negative.    Respiratory: Negative.    Cardiovascular: Negative.    Gastrointestinal: Negative.    Endocrine: Negative.    Genitourinary: Negative.    Musculoskeletal: Negative.    Skin: Negative.    Allergic/Immunologic: " "Negative.    Hematological: Negative.    Psychiatric/Behavioral: Negative.        Objective     Pulse 66   Ht 165.1 cm (65\")   Wt 97.1 kg (214 lb)   SpO2 96%   BMI 35.61 kg/m²     General appearance today is normal.     Physical Exam   Neurological: She has normal strength. She has a normal Finger-Nose-Finger Test. Gait normal.   Reflex Scores:       Bicep reflexes are 1+ on the right side and 1+ on the left side.       Brachioradialis reflexes are 1+ on the right side and 1+ on the left side.       Patellar reflexes are 1+ on the right side and 1+ on the left side.  Psychiatric: Her speech is normal.        Neurologic Exam     Mental Status   Speech: speech is normal   Level of consciousness: alert  Normal comprehension.     Cranial Nerves   Cranial nerves II through XII intact.     Motor Exam   Muscle bulk: normal  Overall muscle tone: normal    Strength   Strength 5/5 throughout.     Sensory Exam   Light touch normal.     Gait, Coordination, and Reflexes     Gait  Gait: normal    Coordination   Finger to nose coordination: normal    Tremor   Resting tremor: absent    Reflexes   Right brachioradialis: 1+  Left brachioradialis: 1+  Right biceps: 1+  Left biceps: 1+  Right patellar: 1+  Left patellar: 1+           Assessment/Plan   Maru was seen today for stroke.    Diagnoses and all orders for this visit:    Right sided weakness    Episode of unresponsiveness          Discussion/Summary   Maru Goncalves comes to clinic today for evaluation of an episode of right sided weakness and decreased responsiveness. The etiology is quite unclear. I am concerned that her symptoms could have potentially been related to a TIA, HTN, or even a seizure. This was discussed in detail. Her workup has been complete and appropriate. Therefore, I do not have any further recommendations concerning this. It was not elected to add any new medications at this time. I discussed the importance of tight blood pressure control. I also " discussed the [place] state driving law, which states that she cannot drive 90 days following any alteration in consciousness. She will then follow up in 2-3 months , or sooner if needed.   I spent 40 minutes face to face with the patient and family with 20 minutes spent on discussing diagnosis, prognosis, diagnostic testing, evaluation, current status, driving, treatment options and management as discussed above.       As part of this visit I reviewed prior lab results, reviewed radiology results, reviewed radiology images, obtained additional history from the family which is incorporated in the HPI and reviewed records from prior hospitalizations which is incorporated in the HPI.      Rain Cerrato PA-C

## 2020-02-07 NOTE — OUTREACH NOTE
Stroke Week 3 Survey      Responses   Facility patient discharged from?  [facility]   Does the patient have one of the following disease processes/diagnoses(primary or secondary)?  Stroke (TIA)   Week 3 attempt successful?  No   Unsuccessful attempts  Attempt 1          Mehdi Moran RN

## 2020-02-10 ENCOUNTER — READMISSION MANAGEMENT (OUTPATIENT)
Dept: CALL CENTER | Facility: HOSPITAL | Age: 50
End: 2020-02-10

## 2020-02-10 NOTE — OUTREACH NOTE
Stroke Week 3 Survey      Responses   Facility patient discharged from?  [facility]   Does the patient have one of the following disease processes/diagnoses(primary or secondary)?  Stroke (TIA)   Week 3 attempt successful?  No   Unsuccessful attempts  Attempt 2          Mehdi Moran RN

## 2021-03-23 ENCOUNTER — BULK ORDERING (OUTPATIENT)
Dept: CASE MANAGEMENT | Facility: OTHER | Age: 51
End: 2021-03-23

## 2021-03-23 DIAGNOSIS — Z23 IMMUNIZATION DUE: ICD-10-CM
